# Patient Record
Sex: FEMALE | Race: WHITE | NOT HISPANIC OR LATINO | Employment: FULL TIME | URBAN - METROPOLITAN AREA
[De-identification: names, ages, dates, MRNs, and addresses within clinical notes are randomized per-mention and may not be internally consistent; named-entity substitution may affect disease eponyms.]

---

## 2017-05-03 DIAGNOSIS — M19.90 ARTHRITIS: ICD-10-CM

## 2017-05-04 RX ORDER — IBUPROFEN 800 MG/1
TABLET ORAL
Qty: 90 TABLET | Refills: 0 | Status: SHIPPED | OUTPATIENT
Start: 2017-05-04 | End: 2017-12-12 | Stop reason: SDUPTHER

## 2017-05-23 ENCOUNTER — OFFICE VISIT (OUTPATIENT)
Dept: FAMILY MEDICINE CLINIC | Facility: CLINIC | Age: 52
End: 2017-05-23

## 2017-05-23 VITALS
HEART RATE: 99 BPM | TEMPERATURE: 98.8 F | BODY MASS INDEX: 36.19 KG/M2 | HEIGHT: 65 IN | OXYGEN SATURATION: 94 % | WEIGHT: 217.2 LBS | DIASTOLIC BLOOD PRESSURE: 92 MMHG | SYSTOLIC BLOOD PRESSURE: 138 MMHG

## 2017-05-23 DIAGNOSIS — J02.9 ACUTE PHARYNGITIS, UNSPECIFIED ETIOLOGY: Primary | ICD-10-CM

## 2017-05-23 DIAGNOSIS — J30.9 ALLERGIC RHINITIS, UNSPECIFIED ALLERGIC RHINITIS TRIGGER, UNSPECIFIED RHINITIS SEASONALITY: ICD-10-CM

## 2017-05-23 LAB
EXPIRATION DATE: NORMAL
INTERNAL CONTROL: NORMAL
Lab: NORMAL
S PYO AG THROAT QL: NEGATIVE

## 2017-05-23 PROCEDURE — 87880 STREP A ASSAY W/OPTIC: CPT | Performed by: NURSE PRACTITIONER

## 2017-05-23 PROCEDURE — 99213 OFFICE O/P EST LOW 20 MIN: CPT | Performed by: NURSE PRACTITIONER

## 2017-05-23 RX ORDER — FLUTICASONE PROPIONATE 50 MCG
SPRAY, SUSPENSION (ML) NASAL
COMMUNITY
End: 2019-08-06

## 2017-12-12 ENCOUNTER — OFFICE VISIT (OUTPATIENT)
Dept: FAMILY MEDICINE CLINIC | Facility: CLINIC | Age: 52
End: 2017-12-12

## 2017-12-12 VITALS
BODY MASS INDEX: 36.69 KG/M2 | WEIGHT: 220.2 LBS | DIASTOLIC BLOOD PRESSURE: 78 MMHG | HEART RATE: 89 BPM | HEIGHT: 65 IN | SYSTOLIC BLOOD PRESSURE: 142 MMHG | TEMPERATURE: 98.2 F | OXYGEN SATURATION: 97 %

## 2017-12-12 DIAGNOSIS — L08.9 TOE INFECTION: Primary | ICD-10-CM

## 2017-12-12 DIAGNOSIS — M19.90 ARTHRITIS: ICD-10-CM

## 2017-12-12 PROCEDURE — 99213 OFFICE O/P EST LOW 20 MIN: CPT | Performed by: NURSE PRACTITIONER

## 2017-12-12 RX ORDER — IBUPROFEN 800 MG/1
800 TABLET ORAL EVERY 8 HOURS PRN
Qty: 90 TABLET | Refills: 2 | Status: SHIPPED | OUTPATIENT
Start: 2017-12-12 | End: 2019-06-12 | Stop reason: SDUPTHER

## 2017-12-12 RX ORDER — CLARITHROMYCIN 500 MG/1
500 TABLET, COATED ORAL 2 TIMES DAILY
Qty: 20 TABLET | Refills: 0 | Status: SHIPPED | OUTPATIENT
Start: 2017-12-12 | End: 2017-12-22

## 2017-12-12 NOTE — PROGRESS NOTES
"Subjective   Jie Gold is a 52 y.o. female who presents today for:    Nail Problem (tripped over lap top and nail was ripped off and she cut it too short.  Red, swollen and pain)    HPI Comments: Ms. Gold presents today with complaints of a swollen right great toe. She injured her to a week ago tripping over an object. The object had chipped the corner of her right toe nail. She has been applying OTC antibiotic ointment but the toe is not any better. She states it is red and swollen and the pain has increased.     I have reviewed the patient's medical history in detail and updated the computerized patient record.    Ms. Gold  reports that she has quit smoking. Her smoking use included Cigarettes. She quit after 30.00 years of use. She has never used smokeless tobacco. She reports that she does not drink alcohol or use illicit drugs.     Allergies   Allergen Reactions   • Penicillins Rash       Current Outpatient Prescriptions:   •  fluticasone (FLONASE) 50 MCG/ACT nasal spray, into each nostril., Disp: , Rfl:   •  ibuprofen (ADVIL,MOTRIN) 800 MG tablet, TAKE ONE TABLET BY MOUTH EVERY 8 HOURS AS NEEDED FOR MILD OR MODERATE PAIN, Disp: 90 tablet, Rfl: 0      Review of Systems   Constitutional: Negative.    Skin: Positive for wound.        Right great toe   Neurological: Negative.          Objective   Vitals:    12/12/17 1601   BP: 142/78   BP Location: Left arm   Patient Position: Sitting   Cuff Size: Adult   Pulse: 89   Temp: 98.2 °F (36.8 °C)   SpO2: 97%   Weight: 99.9 kg (220 lb 3.2 oz)   Height: 165.1 cm (65\")     Physical Exam   Constitutional: She is oriented to person, place, and time. She appears well-developed and well-nourished.   Neurological: She is alert and oriented to person, place, and time.   Skin: Skin is warm and dry.   Right great toe is swollen on the medial aspect, no drainage, or bruising present, pink in color         Assessment/Plan   Jie was seen today for nail " problem.    Diagnoses and all orders for this visit:    Toe infection  -     clarithromycin (BIAXIN) 500 MG tablet; Take 1 tablet by mouth 2 (Two) Times a Day for 10 days.    Arthritis  -     ibuprofen (ADVIL,MOTRIN) 800 MG tablet; Take 1 tablet by mouth Every 8 (Eight) Hours As Needed for Mild Pain .    She has been instructed to keep the area clean and dry. She is to start Biaxin 500 mg twice a day x 10 days. She may continue to apply OTC antibiotic ointment as needed.  Follow up at her next scheduled appointment.

## 2018-01-23 ENCOUNTER — OFFICE VISIT (OUTPATIENT)
Dept: FAMILY MEDICINE CLINIC | Facility: CLINIC | Age: 53
End: 2018-01-23

## 2018-01-23 VITALS
BODY MASS INDEX: 36.8 KG/M2 | OXYGEN SATURATION: 97 % | HEART RATE: 94 BPM | SYSTOLIC BLOOD PRESSURE: 120 MMHG | WEIGHT: 220.9 LBS | HEIGHT: 65 IN | DIASTOLIC BLOOD PRESSURE: 80 MMHG | TEMPERATURE: 98.9 F

## 2018-01-23 DIAGNOSIS — J10.1 INFLUENZA A: ICD-10-CM

## 2018-01-23 DIAGNOSIS — R05.9 COUGH: ICD-10-CM

## 2018-01-23 DIAGNOSIS — R68.89 FLU-LIKE SYMPTOMS: Primary | ICD-10-CM

## 2018-01-23 LAB
EXPIRATION DATE: ABNORMAL
FLUAV AG NPH QL: POSITIVE
FLUBV AG NPH QL: NEGATIVE
INTERNAL CONTROL: ABNORMAL
Lab: ABNORMAL

## 2018-01-23 PROCEDURE — 99213 OFFICE O/P EST LOW 20 MIN: CPT | Performed by: NURSE PRACTITIONER

## 2018-01-23 PROCEDURE — 87804 INFLUENZA ASSAY W/OPTIC: CPT | Performed by: NURSE PRACTITIONER

## 2018-01-23 RX ORDER — GUAIFENESIN AND CODEINE PHOSPHATE 100; 10 MG/5ML; MG/5ML
5 SOLUTION ORAL 3 TIMES DAILY PRN
Qty: 180 ML | Refills: 0 | Status: SHIPPED | OUTPATIENT
Start: 2018-01-23 | End: 2019-03-18

## 2018-01-23 NOTE — PROGRESS NOTES
Subjective   Jie Gold is a 52 y.o. female who presents today for:    Cough (dry and painful); Shortness of Breath (when walking); and Congestion    Influenza   This is a new problem. The current episode started in the past 7 days (5 days ago). The problem occurs constantly. The problem has been rapidly worsening. Associated symptoms include arthralgias, congestion, coughing (dry and non-productive), fatigue, headaches and myalgias. Pertinent negatives include no chills (fresolved now), fever (resolved now) or sore throat. Nothing aggravates the symptoms. She has tried NSAIDs, rest and sleep (OTC cold and flu medications) for the symptoms. The treatment provided mild relief.      I have reviewed the patient's medical history in detail and updated the computerized patient record.    Ms. Gold  reports that she has quit smoking. Her smoking use included Cigarettes. She quit after 30.00 years of use. She has never used smokeless tobacco. She reports that she does not drink alcohol or use illicit drugs.     Allergies   Allergen Reactions   • Penicillins Rash       Current Outpatient Prescriptions:   •  fluticasone (FLONASE) 50 MCG/ACT nasal spray, into each nostril., Disp: , Rfl:   •  ibuprofen (ADVIL,MOTRIN) 800 MG tablet, Take 1 tablet by mouth Every 8 (Eight) Hours As Needed for Mild Pain ., Disp: 90 tablet, Rfl: 2      Review of Systems   Constitutional: Positive for fatigue. Negative for chills (fresolved now) and fever (resolved now).   HENT: Positive for congestion and rhinorrhea. Negative for postnasal drip, sinus pain, sinus pressure and sore throat.    Respiratory: Positive for cough (dry and non-productive).    Cardiovascular: Negative.    Musculoskeletal: Positive for arthralgias and myalgias.   Skin: Negative.    Neurological: Positive for headaches.         Objective   Vitals:    01/23/18 1049   BP: 120/80   BP Location: Left arm   Patient Position: Sitting   Cuff Size: Large Adult   Pulse: 94  "  Temp: 98.9 °F (37.2 °C)   TempSrc: Oral   SpO2: 97%   Weight: 100 kg (220 lb 14.4 oz)   Height: 165.1 cm (65\")     Physical Exam   Constitutional: She is oriented to person, place, and time. She appears well-developed and well-nourished.   HENT:   Right Ear: External ear normal.   Left Ear: External ear normal.   Nose: Mucosal edema (red and swollen) present. No rhinorrhea. Right sinus exhibits no maxillary sinus tenderness and no frontal sinus tenderness. Left sinus exhibits no maxillary sinus tenderness and no frontal sinus tenderness.   Mouth/Throat: Uvula is midline and oropharynx is clear and moist.   Neck: Normal range of motion. Neck supple.   Cardiovascular: Normal rate, regular rhythm and normal heart sounds.    Pulmonary/Chest: Effort normal and breath sounds normal. No respiratory distress. She has no wheezes. She has no rales.   Lymphadenopathy:     She has no cervical adenopathy.   Neurological: She is alert and oriented to person, place, and time.   Skin: Skin is warm and dry.   Psychiatric:   No acute distress   Vitals reviewed.        Assessment/Plan   Jie was seen today for cough, shortness of breath and congestion.    Diagnoses and all orders for this visit:    Flu-like symptoms  -     POC Influenza A / B    Cough  -     guaifenesin-codeine (GUAIFENESIN AC) 100-10 MG/5ML liquid; Take 5 mL by mouth 3 (Three) Times a Day As Needed for Cough or Congestion.    Influenza A    1. POC influenza is positive for influenza A. This is her 5th day with symptoms and she has been afebrile since yesterday. Tamiflu is not indicated at this time.   2. She has been instructed to treat her symptoms. I have ordered Guaifenisen-codeine 100/10 mg per 5 ml three times a days as needed.  3. She is to return to work in 2 days.   "

## 2018-11-21 ENCOUNTER — OFFICE VISIT (OUTPATIENT)
Dept: FAMILY MEDICINE CLINIC | Facility: CLINIC | Age: 53
End: 2018-11-21

## 2018-11-21 VITALS
SYSTOLIC BLOOD PRESSURE: 124 MMHG | BODY MASS INDEX: 36.65 KG/M2 | HEART RATE: 94 BPM | HEIGHT: 65 IN | DIASTOLIC BLOOD PRESSURE: 80 MMHG | TEMPERATURE: 98.2 F | OXYGEN SATURATION: 98 % | WEIGHT: 220 LBS | RESPIRATION RATE: 16 BRPM

## 2018-11-21 DIAGNOSIS — R39.9 UTI SYMPTOMS: Primary | ICD-10-CM

## 2018-11-21 LAB
BILIRUB BLD-MCNC: ABNORMAL MG/DL
CLARITY, POC: ABNORMAL
COLOR UR: YELLOW
GLUCOSE UR STRIP-MCNC: NEGATIVE MG/DL
KETONES UR QL: ABNORMAL
LEUKOCYTE EST, POC: ABNORMAL
NITRITE UR-MCNC: NEGATIVE MG/ML
PH UR: 6.5 [PH] (ref 5–8)
PROT UR STRIP-MCNC: ABNORMAL MG/DL
RBC # UR STRIP: ABNORMAL /UL
SP GR UR: 1.02 (ref 1–1.03)
UROBILINOGEN UR QL: NORMAL

## 2018-11-21 PROCEDURE — 81003 URINALYSIS AUTO W/O SCOPE: CPT | Performed by: NURSE PRACTITIONER

## 2018-11-21 PROCEDURE — 99213 OFFICE O/P EST LOW 20 MIN: CPT | Performed by: NURSE PRACTITIONER

## 2018-11-21 RX ORDER — SULFAMETHOXAZOLE AND TRIMETHOPRIM 800; 160 MG/1; MG/1
1 TABLET ORAL 2 TIMES DAILY
Qty: 5 TABLET | Refills: 0 | Status: SHIPPED | OUTPATIENT
Start: 2018-11-21 | End: 2019-03-18

## 2018-11-21 NOTE — PROGRESS NOTES
Subjective   Jie Gold is a 53 y.o. female.     Chief Complaint   Patient presents with   • Urinary Tract Infection     symptoms      Ms. Cramer presents today with complaints of painful urination that started yesterday. She denies urgency or frequency. She does have pressure with urination. No fever, chills or body aches.   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     I have reviewed the patient's medical history in detail and updated the computerized patient record.     The following portions of the patient's history were reviewed and updated as appropriate: allergies, current medications, past family history, past medical history, past social history, past surgical history and problem list.       Current Outpatient Medications:   •  fluticasone (FLONASE) 50 MCG/ACT nasal spray, into each nostril., Disp: , Rfl:   •  guaifenesin-codeine (GUAIFENESIN AC) 100-10 MG/5ML liquid, Take 5 mL by mouth 3 (Three) Times a Day As Needed for Cough or Congestion., Disp: 180 mL, Rfl: 0  •  ibuprofen (ADVIL,MOTRIN) 800 MG tablet, Take 1 tablet by mouth Every 8 (Eight) Hours As Needed for Mild Pain ., Disp: 90 tablet, Rfl: 2    Review of Systems   Constitutional: Negative.  Negative for chills and fever.   Genitourinary: Positive for dysuria. Negative for decreased urine volume, flank pain, frequency, hematuria and urgency.       Vitals:    11/21/18 1604   BP: 124/80   Pulse: 94   Resp: 16   Temp: 98.2 °F (36.8 °C)   SpO2: 98%     Objective   Physical Exam   Constitutional: She is oriented to person, place, and time. She appears well-developed and well-nourished.   Abdominal: Soft. Bowel sounds are normal. There is no CVA tenderness.   Neurological: She is alert and oriented to person, place, and time.   Skin: Skin is warm and dry.   Psychiatric:   No acute distress   Vitals reviewed.        Assessment/Plan   Jie was seen today for urinary tract infection.    Diagnoses and all orders for this  visit:    UTI symptoms  -     POC Urinalysis Dipstick, Automated  -     Urinalysis With Microscopic - Urine, Clean Catch  -     Urine Culture - Urine, Urine, Clean Catch    Other orders  -     Cancel: POC Blood, Urine, Qualitative, Dipstick  -     Cancel: Urinalysis With Microscopic - Urine, Clean Catch; Future  -     sulfamethoxazole-trimethoprim (BACTRIM DS) 800-160 MG per tablet; Take 1 tablet by mouth 2 (Two) Times a Day.    POC urine is positive for leukocytes and RBC. Negative for nitrates. I will send her urine out for culture and microscopic U/A. I will start her on Bactrim DS 1 tab twice daily x 5 days. Follow up if symptoms worsen or do not resolve.

## 2018-11-25 LAB
APPEARANCE UR: (no result)
BACTERIA #/AREA URNS HPF: ABNORMAL /[HPF]
BACTERIA UR CULT: ABNORMAL
BACTERIA UR CULT: ABNORMAL
BILIRUB UR QL STRIP: NEGATIVE
COLOR UR: YELLOW
CRYSTALS URNS MICRO: ABNORMAL
EPI CELLS #/AREA URNS HPF: ABNORMAL /HPF
GLUCOSE UR QL: NEGATIVE
HGB UR QL STRIP: (no result)
KETONES UR QL STRIP: (no result)
LEUKOCYTE ESTERASE UR QL STRIP: (no result)
MICRO URNS: (no result)
MUCOUS THREADS URNS QL MICRO: PRESENT
NITRITE UR QL STRIP: NEGATIVE
OTHER ANTIBIOTIC SUSC ISLT: ABNORMAL
PH UR STRIP: 6 [PH] (ref 5–7.5)
PROT UR QL STRIP: (no result)
RBC #/AREA URNS HPF: >30 /HPF
SP GR UR: 1.02 (ref 1–1.03)
UNIDENT CRYS URNS QL MICRO: PRESENT
UROBILINOGEN UR STRIP-MCNC: 1 MG/DL (ref 0.2–1)
WBC #/AREA URNS HPF: >30 /HPF

## 2019-03-18 ENCOUNTER — OFFICE VISIT (OUTPATIENT)
Dept: FAMILY MEDICINE CLINIC | Facility: CLINIC | Age: 54
End: 2019-03-18

## 2019-03-18 VITALS
WEIGHT: 231 LBS | RESPIRATION RATE: 16 BRPM | HEIGHT: 65 IN | TEMPERATURE: 98.6 F | DIASTOLIC BLOOD PRESSURE: 78 MMHG | OXYGEN SATURATION: 98 % | SYSTOLIC BLOOD PRESSURE: 130 MMHG | BODY MASS INDEX: 38.49 KG/M2 | HEART RATE: 99 BPM

## 2019-03-18 DIAGNOSIS — R03.0 ELEVATED BLOOD PRESSURE READING: Primary | ICD-10-CM

## 2019-03-18 PROCEDURE — 99213 OFFICE O/P EST LOW 20 MIN: CPT | Performed by: NURSE PRACTITIONER

## 2019-03-18 NOTE — PROGRESS NOTES
"Subjective   Jie Gold is a 53 y.o. female.     Chief Complaint   Patient presents with   • Hypertension     Ms. Gold presents today because she had elevated blood pressure readings 3 days ago on Friday. She denies headaches, vision changes, edema, chest pain, fatigue or shortness of breath. She is not on any medications for hypertension. She does take NSAIDS for pain as needed.        I have reviewed the patient's medical history in detail and updated the computerized patient record.    The following portions of the patient's history were reviewed and updated as appropriate: allergies, current medications, past family history, past medical history, past social history, past surgical history and problem list.       Current Outpatient Medications:   •  fluticasone (FLONASE) 50 MCG/ACT nasal spray, into each nostril., Disp: , Rfl:   •  ibuprofen (ADVIL,MOTRIN) 800 MG tablet, Take 1 tablet by mouth Every 8 (Eight) Hours As Needed for Mild Pain ., Disp: 90 tablet, Rfl: 2  •  guaifenesin-codeine (GUAIFENESIN AC) 100-10 MG/5ML liquid, Take 5 mL by mouth 3 (Three) Times a Day As Needed for Cough or Congestion., Disp: 180 mL, Rfl: 0  •  sulfamethoxazole-trimethoprim (BACTRIM DS) 800-160 MG per tablet, Take 1 tablet by mouth 2 (Two) Times a Day., Disp: 5 tablet, Rfl: 0    Review of Systems   Constitutional: Negative.    Respiratory: Negative.    Cardiovascular: Negative.    Musculoskeletal: Negative.    Neurological: Negative.         Vitals:    03/18/19 1556   BP: 130/78   BP Location: Left arm   Patient Position: Sitting   Cuff Size: Adult   Pulse: 99   Resp: 16   Temp: 98.6 °F (37 °C)   TempSrc: Oral   SpO2: 98%   Weight: 105 kg (231 lb)   Height: 165.1 cm (65\")       Objective   Physical Exam   Constitutional: She is oriented to person, place, and time. She appears well-developed and well-nourished.   Cardiovascular: Normal rate, regular rhythm, normal heart sounds and intact distal pulses.   Pulmonary/Chest: " Effort normal and breath sounds normal.   Neurological: She is alert and oriented to person, place, and time.   Skin: Skin is warm and dry.   Psychiatric:   No acute distress   Vitals reviewed.        Assessment/Plan   Jie was seen today for hypertension.    Diagnoses and all orders for this visit:    Elevated blood pressure reading      Her blood pressure today in the office is 130/78. I have asked her to start checking her blood pressure daily over the next 2 weeks and record the pressures in a log. She is to return in 2 weeks with her log to re-evaluate her blood pressure.

## 2019-04-03 ENCOUNTER — OFFICE VISIT (OUTPATIENT)
Dept: FAMILY MEDICINE CLINIC | Facility: CLINIC | Age: 54
End: 2019-04-03

## 2019-04-03 VITALS
TEMPERATURE: 98.3 F | SYSTOLIC BLOOD PRESSURE: 118 MMHG | HEIGHT: 65 IN | WEIGHT: 233 LBS | DIASTOLIC BLOOD PRESSURE: 80 MMHG | RESPIRATION RATE: 16 BRPM | OXYGEN SATURATION: 98 % | BODY MASS INDEX: 38.82 KG/M2 | HEART RATE: 84 BPM

## 2019-04-03 DIAGNOSIS — R03.0 ELEVATED BLOOD-PRESSURE READING WITHOUT DIAGNOSIS OF HYPERTENSION: ICD-10-CM

## 2019-04-03 DIAGNOSIS — R10.11 RIGHT UPPER QUADRANT ABDOMINAL PAIN: Primary | ICD-10-CM

## 2019-04-03 PROCEDURE — 99214 OFFICE O/P EST MOD 30 MIN: CPT | Performed by: NURSE PRACTITIONER

## 2019-04-03 NOTE — PROGRESS NOTES
"Subjective   Jie Gold is a 54 y.o. female.     Chief Complaint   Patient presents with   • Hypertension     2 week f/u     Ms. Gold presents today to follow up on an episode of elevated blood pressure that she had 2 weeks ago. She states she is feeling much better than she did at her last visit and that she has been keeping a log of her blood pressures. The highest her B/P has been running was 141/87.     I have reviewed the patient's medical history in detail and updated the computerized patient record.    The following portions of the patient's history were reviewed and updated as appropriate: allergies, current medications, past family history, past medical history, past social history, past surgical history and problem list.       Current Outpatient Medications:   •  fluticasone (FLONASE) 50 MCG/ACT nasal spray, into each nostril., Disp: , Rfl:   •  ibuprofen (ADVIL,MOTRIN) 800 MG tablet, Take 1 tablet by mouth Every 8 (Eight) Hours As Needed for Mild Pain ., Disp: 90 tablet, Rfl: 2        Review of Systems   Constitutional: Negative.    Respiratory: Negative.    Cardiovascular: Negative.    Gastrointestinal: Positive for abdominal pain. Negative for abdominal distention, blood in stool, constipation, diarrhea, nausea and vomiting.   Genitourinary: Difficulty urinating: intermittent right upper quadrent pain.   Neurological: Negative.         Vitals:    04/03/19 1605   BP: 118/80   BP Location: Left arm   Patient Position: Sitting   Cuff Size: Adult   Pulse: 84   Resp: 16   Temp: 98.3 °F (36.8 °C)   TempSrc: Oral   SpO2: 98%   Weight: 106 kg (233 lb)   Height: 165.1 cm (65\")       Objective   Physical Exam   Constitutional: She is oriented to person, place, and time. She appears well-developed and well-nourished.   Cardiovascular: Normal rate, regular rhythm and normal heart sounds.   Pulmonary/Chest: Effort normal and breath sounds normal.   Abdominal: Soft. Bowel sounds are normal. There is no " tenderness.   Neurological: She is alert and oriented to person, place, and time.   Skin: Skin is warm and dry.   Psychiatric:   No acute distress   Vitals reviewed.        Assessment/Plan   Jie was seen today for hypertension.    Diagnoses and all orders for this visit:    Right upper quadrant abdominal pain  -     US Abdomen Complete    Elevated blood-pressure reading without diagnosis of hypertension    1. I reviewed the blood pressure log that she brought with her today. Her blood pressures have been running from 112/64 to 141/87. She is averaging between 120-130/ 70 - 80.   2. We discussed life style changes she can make to help manage her blood pressure and assist with weight loss.  3. She is still having intermittent right upper quadrant abdominal pain. She no longer has her gallbladder. We discussed doing a abdominal ultrasound at her last visit. I have ordered one today for further evaluation.   3. She is to follow up with me in 6 months on her blood pressure.

## 2019-06-12 ENCOUNTER — OFFICE VISIT (OUTPATIENT)
Dept: FAMILY MEDICINE CLINIC | Facility: CLINIC | Age: 54
End: 2019-06-12

## 2019-06-12 VITALS
BODY MASS INDEX: 38.51 KG/M2 | TEMPERATURE: 98 F | DIASTOLIC BLOOD PRESSURE: 68 MMHG | HEIGHT: 65 IN | RESPIRATION RATE: 16 BRPM | WEIGHT: 231.1 LBS | OXYGEN SATURATION: 98 % | SYSTOLIC BLOOD PRESSURE: 118 MMHG | HEART RATE: 91 BPM

## 2019-06-12 DIAGNOSIS — R20.2 NUMBNESS AND TINGLING IN BOTH HANDS: ICD-10-CM

## 2019-06-12 DIAGNOSIS — M19.90 ARTHRITIS: ICD-10-CM

## 2019-06-12 DIAGNOSIS — R20.0 NUMBNESS AND TINGLING IN BOTH HANDS: ICD-10-CM

## 2019-06-12 DIAGNOSIS — M25.50 GENERALIZED JOINT PAIN: Primary | ICD-10-CM

## 2019-06-12 PROCEDURE — 99214 OFFICE O/P EST MOD 30 MIN: CPT | Performed by: NURSE PRACTITIONER

## 2019-06-12 PROCEDURE — 72040 X-RAY EXAM NECK SPINE 2-3 VW: CPT | Performed by: NURSE PRACTITIONER

## 2019-06-12 RX ORDER — IBUPROFEN 800 MG/1
800 TABLET ORAL EVERY 8 HOURS PRN
Qty: 90 TABLET | Refills: 2 | Status: SHIPPED | OUTPATIENT
Start: 2019-06-12 | End: 2020-06-23

## 2019-06-14 LAB
ANA SER QL: NEGATIVE
CARDIOLIPIN IGG SER IA-ACNC: <9 GPL U/ML (ref 0–14)
CARDIOLIPIN IGM SER IA-ACNC: <9 MPL U/ML (ref 0–12)
CCP IGA+IGG SERPL IA-ACNC: 4 UNITS (ref 0–19)
CENTROMERE B AB SER-ACNC: <0.2 AI (ref 0–0.9)
CHROMATIN AB SERPL-ACNC: <0.2 AI (ref 0–0.9)
CK SERPL-CCNC: 82 U/L (ref 24–173)
CRP SERPL-MCNC: 7.4 MG/L (ref 0–4.9)
DSDNA AB SER-ACNC: <1 IU/ML (ref 0–9)
ENA JO1 AB SER-ACNC: <0.2 AI (ref 0–0.9)
ENA RNP AB SER-ACNC: <0.2 AI (ref 0–0.9)
ENA SCL70 AB SER-ACNC: <0.2 AI (ref 0–0.9)
ENA SM AB SER-ACNC: <0.2 AI (ref 0–0.9)
ENA SS-A AB SER-ACNC: <0.2 AI (ref 0–0.9)
ENA SS-B AB SER-ACNC: <0.2 AI (ref 0–0.9)
ERYTHROCYTE [SEDIMENTATION RATE] IN BLOOD BY WESTERGREN METHOD: NORMAL MM/HR
Lab: NORMAL
SPECIMEN STATUS: NORMAL

## 2019-08-06 ENCOUNTER — OFFICE VISIT (OUTPATIENT)
Dept: FAMILY MEDICINE CLINIC | Facility: CLINIC | Age: 54
End: 2019-08-06

## 2019-08-06 VITALS
TEMPERATURE: 98.3 F | HEART RATE: 92 BPM | SYSTOLIC BLOOD PRESSURE: 118 MMHG | BODY MASS INDEX: 38.49 KG/M2 | DIASTOLIC BLOOD PRESSURE: 70 MMHG | RESPIRATION RATE: 16 BRPM | OXYGEN SATURATION: 98 % | WEIGHT: 231 LBS | HEIGHT: 65 IN

## 2019-08-06 DIAGNOSIS — R22.41 FOOT MASS, RIGHT: Primary | ICD-10-CM

## 2019-08-06 PROCEDURE — 99213 OFFICE O/P EST LOW 20 MIN: CPT | Performed by: NURSE PRACTITIONER

## 2019-08-06 PROCEDURE — 73630 X-RAY EXAM OF FOOT: CPT | Performed by: NURSE PRACTITIONER

## 2019-08-06 NOTE — PROGRESS NOTES
"Subjective   Jie Gold is a 54 y.o. female.     Chief Complaint   Patient presents with   • Foot Swelling     Ms. Gold presents today with concerns about a lump she has on the bottom of her right foot. She noticed the lump 5 days ago and reports it feels like there is something in her shoe when she walks. She denies any injuries to her foot.     I have reviewed the patient's medical history in detail and updated the computerized patient record.     The following portions of the patient's history were reviewed and updated as appropriate: allergies, current medications, past family history, past medical history, past social history, past surgical history and problem list.       Current Outpatient Medications:   •  fluticasone (FLONASE) 50 MCG/ACT nasal spray, into each nostril., Disp: , Rfl:   •  ibuprofen (ADVIL,MOTRIN) 800 MG tablet, Take 1 tablet by mouth Every 8 (Eight) Hours As Needed for Mild Pain ., Disp: 90 tablet, Rfl: 2    Review of Systems   Constitutional: Negative.    Musculoskeletal:        Lump on the bottom of her right foot        Vitals:    08/06/19 1605   BP: 118/70   BP Location: Left arm   Patient Position: Sitting   Cuff Size: Adult   Pulse: 92   Resp: 16   Temp: 98.3 °F (36.8 °C)   TempSrc: Oral   SpO2: 98%   Weight: 105 kg (231 lb)   Height: 165.1 cm (65\")       Objective   Physical Exam   Constitutional: She is oriented to person, place, and time. She appears well-developed and well-nourished.   Musculoskeletal:        Right foot: There is swelling (soft tissue swelling located between the ball of her right foot and the arch. it is soft  and mobile). There is normal range of motion.   Neurological: She is alert and oriented to person, place, and time.   Psychiatric:   No acute distress   Vitals reviewed.        Assessment/Plan   Jie was seen today for foot swelling.    Diagnoses and all orders for this visit:    Foot mass, right  -     XR Foot 3+ View Right  -     Ambulatory " Referral to Podiatry      Ms. Gold present today with complaints of a lump on the bottom of her right foot. I have reviewed the x-ray of her right foot. I do see some soft tissue swelling but no other abnormalities. I have no other films to compare this too. I am referring her to podiatry for further evaluation.

## 2019-09-20 ENCOUNTER — OFFICE VISIT (OUTPATIENT)
Dept: FAMILY MEDICINE CLINIC | Facility: CLINIC | Age: 54
End: 2019-09-20

## 2019-09-20 VITALS
RESPIRATION RATE: 16 BRPM | SYSTOLIC BLOOD PRESSURE: 150 MMHG | WEIGHT: 229.5 LBS | HEIGHT: 65 IN | TEMPERATURE: 98.3 F | HEART RATE: 86 BPM | DIASTOLIC BLOOD PRESSURE: 90 MMHG | OXYGEN SATURATION: 98 % | BODY MASS INDEX: 38.24 KG/M2

## 2019-09-20 DIAGNOSIS — J30.1 SEASONAL ALLERGIC RHINITIS DUE TO POLLEN: ICD-10-CM

## 2019-09-20 DIAGNOSIS — R05.9 COUGH: Primary | ICD-10-CM

## 2019-09-20 PROCEDURE — 99213 OFFICE O/P EST LOW 20 MIN: CPT | Performed by: NURSE PRACTITIONER

## 2019-09-20 RX ORDER — BROMPHENIRAMINE MALEATE, PSEUDOEPHEDRINE HYDROCHLORIDE, AND DEXTROMETHORPHAN HYDROBROMIDE 2; 30; 10 MG/5ML; MG/5ML; MG/5ML
5 SYRUP ORAL 4 TIMES DAILY PRN
Qty: 118 ML | Refills: 0 | Status: SHIPPED | OUTPATIENT
Start: 2019-09-20 | End: 2020-03-06

## 2019-09-20 NOTE — PROGRESS NOTES
"Subjective   Jie Gold is a 54 y.o. female.     Cough   This is a new problem. The current episode started yesterday (`started last night). The problem has been unchanged. The problem occurs every few minutes. The cough is non-productive. Associated symptoms include chills and nasal congestion. Pertinent negatives include no ear congestion, fever, heartburn, myalgias, postnasal drip, rhinorrhea, shortness of breath or wheezing. Nothing aggravates the symptoms. She has tried nothing for the symptoms.      I have reviewed the patient's medical history in detail and updated the computerized patient record.    The following portions of the patient's history were reviewed and updated as appropriate: allergies, current medications, past family history, past medical history, past social history, past surgical history and problem list.       Current Outpatient Medications:   •  ibuprofen (ADVIL,MOTRIN) 800 MG tablet, Take 1 tablet by mouth Every 8 (Eight) Hours As Needed for Mild Pain ., Disp: 90 tablet, Rfl: 2  •  brompheniramine-pseudoephedrine-DM 30-2-10 MG/5ML syrup, Take 5 mL by mouth 4 (Four) Times a Day As Needed for Congestion or Cough., Disp: 118 mL, Rfl: 0    Review of Systems   Constitutional: Positive for chills. Negative for fever.   HENT: Negative for postnasal drip and rhinorrhea.    Respiratory: Positive for cough. Negative for shortness of breath and wheezing.    Musculoskeletal: Negative for myalgias.        Vitals:    09/20/19 1551   BP: 150/90   BP Location: Left arm   Patient Position: Sitting   Cuff Size: Adult   Pulse: 86   Resp: 16   Temp: 98.3 °F (36.8 °C)   TempSrc: Oral   SpO2: 98%   Weight: 104 kg (229 lb 8 oz)   Height: 165.1 cm (65\")       Objective   Physical Exam   Constitutional: She is oriented to person, place, and time.   HENT:   Right Ear: Tympanic membrane is not erythematous and not bulging. A middle ear effusion is present.   Left Ear: Tympanic membrane is not erythematous and " not bulging. A middle ear effusion is present.   Mouth/Throat: Oropharynx is clear and moist.   Cardiovascular: Normal rate, regular rhythm, normal heart sounds and intact distal pulses.   Pulmonary/Chest: Effort normal and breath sounds normal. She has no wheezes. She has no rales.   Neurological: She is alert and oriented to person, place, and time.   Skin: Skin is warm and dry.   Vitals reviewed.        Assessment/Plan   Jie was seen today for uri.    Diagnoses and all orders for this visit:    Cough    Seasonal allergic rhinitis due to pollen    Other orders  -     brompheniramine-pseudoephedrine-DM 30-2-10 MG/5ML syrup; Take 5 mL by mouth 4 (Four) Times a Day As Needed for Congestion or Cough.        You have been diagnosed with allergic rhinitis. Symptomatic treatment is best.  You may take Mucinex D for relieving congestion and cough.  If you have high blood pressure, do not take Mucinex D, instead opting for plain Mucinex and Coricidin HBP. Oral antihistamine, such as Allegra, Zyrtec or Claritin may help reduce ear pressure and relieve some nasal symptoms.  A saline nasal spray may be used to keep nose clear from discharge.  Be sure that you are increasing your intake of clear to decaffeinated fluids and get plenty of rest.  If your symptoms worsen or persist follow up as needed.

## 2020-03-06 ENCOUNTER — OFFICE VISIT (OUTPATIENT)
Dept: FAMILY MEDICINE CLINIC | Facility: CLINIC | Age: 55
End: 2020-03-06

## 2020-03-06 VITALS
HEIGHT: 65 IN | WEIGHT: 231.9 LBS | SYSTOLIC BLOOD PRESSURE: 132 MMHG | DIASTOLIC BLOOD PRESSURE: 82 MMHG | BODY MASS INDEX: 38.64 KG/M2 | HEART RATE: 88 BPM | OXYGEN SATURATION: 97 %

## 2020-03-06 DIAGNOSIS — M25.512 CHRONIC LEFT SHOULDER PAIN: Primary | ICD-10-CM

## 2020-03-06 DIAGNOSIS — G89.29 CHRONIC LEFT SHOULDER PAIN: Primary | ICD-10-CM

## 2020-03-06 PROCEDURE — 99213 OFFICE O/P EST LOW 20 MIN: CPT | Performed by: NURSE PRACTITIONER

## 2020-03-06 NOTE — PROGRESS NOTES
Subjective   Jie Gold is a 54 y.o. female.     Chief Complaint   Patient presents with   • Shoulder Pain     Left     Shoulder Injury    Incident location: no inury. The left shoulder is affected. Incident onset: pain has been going on for a year. There was no injury mechanism. The quality of the pain is described as aching. The pain does not radiate. The pain is at a severity of 4/10. The pain is mild. Pertinent negatives include no numbness. Exacerbated by: sleeping on the affected side. She has tried NSAIDs for the symptoms. The treatment provided moderate relief.        I have reviewed the patient's medical history in detail and updated the computerized patient record.    The following portions of the patient's history were reviewed and updated as appropriate: allergies, current medications, past family history, past medical history, past social history, past surgical history and problem list.      Current Outpatient Medications:   •  ibuprofen (ADVIL,MOTRIN) 800 MG tablet, Take 1 tablet by mouth Every 8 (Eight) Hours As Needed for Mild Pain ., Disp: 90 tablet, Rfl: 2     Review of Systems   Constitutional: Negative.    Respiratory: Negative.    Cardiovascular: Negative.    Musculoskeletal: Positive for arthralgias (left shoulder pain).   Neurological: Negative.  Negative for numbness.       Objective    Vitals:    03/06/20 1610   BP: 132/82   Pulse: 88   SpO2: 97%     Physical Exam   Constitutional: She is oriented to person, place, and time. She appears well-developed and well-nourished.   Cardiovascular: Normal rate, regular rhythm and normal heart sounds.   Pulmonary/Chest: Effort normal and breath sounds normal.   Musculoskeletal:        Left shoulder: She exhibits normal range of motion, no swelling, no effusion, no pain, no spasm and normal strength.   Neurological: She is alert and oriented to person, place, and time.   Skin: Skin is warm and dry.   Psychiatric:   No acute distress   Vitals  reviewed.        Assessment/Plan   Jie was seen today for shoulder pain.    Diagnoses and all orders for this visit:    Chronic left shoulder pain  -     Ambulatory Referral to Physical Therapy Evaluate and treat; Stretching, Strengthening    Ms Gold presents today with complaints of shoulder pain that has been going on for a year. She still has ROM, no swelling or effusion.  I am referring her to physical therapy for evaluation and treatment of left shoulder pain.   She is to follow up at her next scheduled appointment.

## 2020-05-04 ENCOUNTER — TELEPHONE (OUTPATIENT)
Dept: FAMILY MEDICINE CLINIC | Facility: CLINIC | Age: 55
End: 2020-05-04

## 2020-05-04 NOTE — TELEPHONE ENCOUNTER
Called patient- she is having SOA that has been going on for approx 1 year. She also reports her heart fluttering when exerting herself.  She denies chest pain.     She also denied cough, sorethroat, and fever. Scheduled her for tomorrow at 8:45.

## 2020-05-04 NOTE — TELEPHONE ENCOUNTER
PATIENT STATES SHE IS HAVING SHORTNESS BREATH JUST BY WALKING.PATIENT REPORTS NOT CONSTANT COUGH ,SORE THROAT AND DOES NOT HAVE A  FEVER. PATIENT DECLINED VIDEO VISIT.    PLEASE ADVISE

## 2020-05-05 ENCOUNTER — OFFICE VISIT (OUTPATIENT)
Dept: FAMILY MEDICINE CLINIC | Facility: CLINIC | Age: 55
End: 2020-05-05

## 2020-05-05 VITALS
WEIGHT: 262 LBS | SYSTOLIC BLOOD PRESSURE: 126 MMHG | HEIGHT: 65 IN | TEMPERATURE: 96 F | BODY MASS INDEX: 43.65 KG/M2 | HEART RATE: 92 BPM | DIASTOLIC BLOOD PRESSURE: 86 MMHG | OXYGEN SATURATION: 98 %

## 2020-05-05 DIAGNOSIS — E66.9 OBESITY WITH SERIOUS COMORBIDITY, UNSPECIFIED CLASSIFICATION, UNSPECIFIED OBESITY TYPE: ICD-10-CM

## 2020-05-05 DIAGNOSIS — F41.9 ANXIETY: ICD-10-CM

## 2020-05-05 DIAGNOSIS — R00.2 PALPITATIONS: Primary | ICD-10-CM

## 2020-05-05 DIAGNOSIS — R53.83 FATIGUE, UNSPECIFIED TYPE: ICD-10-CM

## 2020-05-05 DIAGNOSIS — R06.02 SHORTNESS OF BREATH ON EXERTION: ICD-10-CM

## 2020-05-05 LAB
ALBUMIN SERPL-MCNC: 4.2 G/DL (ref 3.5–5.2)
ALBUMIN/GLOB SERPL: 1.6 G/DL
ALP SERPL-CCNC: 115 U/L (ref 39–117)
ALT SERPL-CCNC: 23 U/L (ref 1–33)
AST SERPL-CCNC: 18 U/L (ref 1–32)
BILIRUB SERPL-MCNC: 0.4 MG/DL (ref 0.2–1.2)
BUN SERPL-MCNC: 12 MG/DL (ref 6–20)
BUN/CREAT SERPL: 12.6 (ref 7–25)
CALCIUM SERPL-MCNC: 9 MG/DL (ref 8.6–10.5)
CHLORIDE SERPL-SCNC: 101 MMOL/L (ref 98–107)
CO2 SERPL-SCNC: 28.9 MMOL/L (ref 22–29)
CREAT SERPL-MCNC: 0.95 MG/DL (ref 0.57–1)
ERYTHROCYTE [DISTWIDTH] IN BLOOD BY AUTOMATED COUNT: 12.8 % (ref 12.3–15.4)
GLOBULIN SER CALC-MCNC: 2.7 GM/DL
GLUCOSE SERPL-MCNC: 96 MG/DL (ref 65–99)
HBA1C MFR BLD: 5.2 % (ref 4.8–5.6)
HCT VFR BLD AUTO: 39.8 % (ref 34–46.6)
HGB BLD-MCNC: 13.6 G/DL (ref 12–15.9)
IRON SATN MFR SERPL: 24 % (ref 20–50)
IRON SERPL-MCNC: 98 MCG/DL (ref 37–145)
MCH RBC QN AUTO: 28.3 PG (ref 26.6–33)
MCHC RBC AUTO-ENTMCNC: 34.2 G/DL (ref 31.5–35.7)
MCV RBC AUTO: 82.7 FL (ref 79–97)
PLATELET # BLD AUTO: 246 10*3/MM3 (ref 140–450)
POTASSIUM SERPL-SCNC: 4.4 MMOL/L (ref 3.5–5.2)
PROT SERPL-MCNC: 6.9 G/DL (ref 6–8.5)
RBC # BLD AUTO: 4.81 10*6/MM3 (ref 3.77–5.28)
SODIUM SERPL-SCNC: 138 MMOL/L (ref 136–145)
TIBC SERPL-MCNC: 414 MCG/DL
TSH SERPL DL<=0.005 MIU/L-ACNC: 3.57 UIU/ML (ref 0.27–4.2)
UIBC SERPL-MCNC: 316 MCG/DL (ref 112–346)
WBC # BLD AUTO: 4.86 10*3/MM3 (ref 3.4–10.8)

## 2020-05-05 PROCEDURE — 93000 ELECTROCARDIOGRAM COMPLETE: CPT | Performed by: NURSE PRACTITIONER

## 2020-05-05 PROCEDURE — 99214 OFFICE O/P EST MOD 30 MIN: CPT | Performed by: NURSE PRACTITIONER

## 2020-05-05 RX ORDER — OXYMETAZOLINE HYDROCHLORIDE 0.05 G/100ML
2 SPRAY NASAL AS NEEDED
COMMUNITY
End: 2020-11-20

## 2020-05-05 RX ORDER — SERTRALINE HYDROCHLORIDE 25 MG/1
25 TABLET, FILM COATED ORAL DAILY
Qty: 30 TABLET | Refills: 5 | Status: SHIPPED | OUTPATIENT
Start: 2020-05-05 | End: 2020-10-21

## 2020-05-05 RX ORDER — FLUTICASONE PROPIONATE 50 MCG
2 SPRAY, SUSPENSION (ML) NASAL DAILY
COMMUNITY

## 2020-05-05 NOTE — PROGRESS NOTES
Subjective   Jie Gold is a 55 y.o. female.     Chief Complaint   Patient presents with   • Shortness of Breath     Ms. Gold presents today because she has been having difficulty breathing for off and on for the past year. She reports shortness of breath with exertion, no energy, and sometimes she can feel her heart racing/palpitations. She doesn't know if this is related to her weight gain over the past year since she has lost weight or if it is her allergies. She knows that she has difficulty breathing through her nose because of her allergies. She also reports she has anxiety and she has difficulty falling asleep at night because she worries and can't turn her mind off.        I have reviewed the patient's medical history in detail and updated the computerized patient record.    The following portions of the patient's history were reviewed and updated as appropriate: allergies, current medications, past family history, past medical history, past social history, past surgical history and problem list.      Current Outpatient Medications:   •  fluticasone (FLONASE) 50 MCG/ACT nasal spray, 2 sprays into the nostril(s) as directed by provider Daily., Disp: , Rfl:   •  ibuprofen (ADVIL,MOTRIN) 800 MG tablet, Take 1 tablet by mouth Every 8 (Eight) Hours As Needed for Mild Pain ., Disp: 90 tablet, Rfl: 2  •  oxymetazoline (AFRIN) 0.05 % nasal spray, 2 sprays into the nostril(s) as directed by provider As Needed., Disp: , Rfl:   •  sertraline (ZOLOFT) 25 MG tablet, Take 1 tablet by mouth Daily., Disp: 30 tablet, Rfl: 5     Review of Systems   Constitutional: Negative.         Has gained weight since she quit smoking a year ago   Respiratory: Positive for shortness of breath (with exertion). Negative for cough, chest tightness and wheezing.    Cardiovascular: Positive for palpitations. Negative for chest pain and leg swelling.   Neurological: Negative.  Negative for dizziness and light-headedness.    Psychiatric/Behavioral: Positive for sleep disturbance (has difficulty falling asleep). The patient is nervous/anxious.        Objective    Vitals:    05/05/20 0845   BP: 126/86   Pulse: 92   Temp: 96 °F (35.6 °C)   SpO2: 98%     Body mass index is 43.6 kg/m².     Physical Exam   Constitutional: She is oriented to person, place, and time. She appears well-developed and well-nourished.   Neck: Normal range of motion. Neck supple. No thyromegaly present.   Cardiovascular: Normal rate, regular rhythm, normal heart sounds and intact distal pulses.   Pulmonary/Chest: Effort normal and breath sounds normal.   Lymphadenopathy:     She has no cervical adenopathy.   Neurological: She is alert and oriented to person, place, and time.   Skin: Skin is warm and dry.   Psychiatric:   No acute distress   Vitals reviewed.        Assessment/Plan   Jie was seen today for shortness of breath.    Diagnoses and all orders for this visit:    Palpitations  -     ECG 12 Lead  -     Iron Profile  -     TSH  -     CBC (No Diff)  -     Comprehensive Metabolic Panel    Fatigue, unspecified type  -     Iron Profile  -     TSH  -     CBC (No Diff)  -     Hemoglobin A1c  -     Comprehensive Metabolic Panel    Anxiety  -     Iron Profile  -     TSH  -     CBC (No Diff)  -     sertraline (ZOLOFT) 25 MG tablet; Take 1 tablet by mouth Daily.    Shortness of breath on exertion    Obesity with serious comorbidity, unspecified classification, unspecified obesity type      Ms. Gold presents today with several concerns.   I have ordered and reviewed an EKG today. Her EKG shows normal sinus rhythm. I have no other EKGs to compare this to.   I have ordered the above noted labs to assess for thyroid disease, anemia and diabetes.   I have also discussed her anxiety with her and she has agreed to start Zoloft 25 mg daily.   She is to follow up with me in 6 weeks on her anxiety.

## 2020-06-16 ENCOUNTER — TELEPHONE (OUTPATIENT)
Dept: FAMILY MEDICINE CLINIC | Facility: CLINIC | Age: 55
End: 2020-06-16

## 2020-06-16 NOTE — TELEPHONE ENCOUNTER
LMTRC, I am not sure what kind of pain she has. We did refer to PT for L shoulder pain in March- maybe it was canceled d/t COVID.      HUB please transfer if she calls.

## 2020-06-16 NOTE — TELEPHONE ENCOUNTER
PATIENT CALLING IN TO ON THE REFERRAL TO PHYSICAL THERAPY.     SHE STATES THE PAIN GETS SO BAD SHE GETS NAUSEAU.  IT IS A SHARP SHOOTING PAIN    PLEASE CALL AND ADVISE [phone]

## 2020-06-17 NOTE — TELEPHONE ENCOUNTER
Scheduled patient for the 23rd @ 4 to get her shoulder examined. She is not comfortable doing PT d/t the popping and pain. We will reeval and also do a f/u on her new medication

## 2020-06-23 ENCOUNTER — OFFICE VISIT (OUTPATIENT)
Dept: FAMILY MEDICINE CLINIC | Facility: CLINIC | Age: 55
End: 2020-06-23

## 2020-06-23 VITALS
TEMPERATURE: 97.8 F | HEART RATE: 94 BPM | WEIGHT: 230 LBS | BODY MASS INDEX: 38.32 KG/M2 | OXYGEN SATURATION: 97 % | DIASTOLIC BLOOD PRESSURE: 84 MMHG | HEIGHT: 65 IN | SYSTOLIC BLOOD PRESSURE: 138 MMHG

## 2020-06-23 DIAGNOSIS — F41.9 ANXIETY: ICD-10-CM

## 2020-06-23 DIAGNOSIS — M25.512 CHRONIC LEFT SHOULDER PAIN: Primary | ICD-10-CM

## 2020-06-23 DIAGNOSIS — G89.29 CHRONIC LEFT SHOULDER PAIN: Primary | ICD-10-CM

## 2020-06-23 PROCEDURE — 99214 OFFICE O/P EST MOD 30 MIN: CPT | Performed by: NURSE PRACTITIONER

## 2020-06-23 PROCEDURE — 73030 X-RAY EXAM OF SHOULDER: CPT | Performed by: NURSE PRACTITIONER

## 2020-06-23 RX ORDER — ACETAMINOPHEN 325 MG/1
650 TABLET ORAL EVERY 6 HOURS PRN
COMMUNITY

## 2020-06-23 NOTE — PROGRESS NOTES
Subjective   Jie Gold is a 55 y.o. female.     Chief Complaint   Patient presents with   • Shoulder Pain     LT   • Anxiety     Ms. Gold is here today to follow up on her anxiety and left shoulder pain. I had seen her back in March for her shoulder pain. At that time I had referred her to physical therapy for evaluation and treatment. Covid restrictions started shortly after her visit and the PT was postponed. She did not follow up on the PT once once face to face visit were allowed because she did not want to put herself at risk for covid. She reports she is still having pain and decreased ROM in some positions of her arm.   She is also following up on her anxiety. She was started on Zoloft 25 mg at her last visit. She reports that the Zoloft has really helped her anxiety. She is no longer having heart palpitations.       Arm Pain    Incident onset: no incident. The pain is present in the left shoulder. The quality of the pain is described as aching. The pain does not radiate. The pain is moderate. The pain has been constant since the incident. Associated symptoms include tingling (haydee at night in her hands). Pertinent negatives include no numbness. The symptoms are aggravated by movement and lifting. She has tried acetaminophen and NSAIDs for the symptoms.        I have reviewed the patient's medical history in detail and updated the computerized patient record.    The following portions of the patient's history were reviewed and updated as appropriate: allergies, current medications, past family history, past medical history, past social history, past surgical history and problem list.      Current Outpatient Medications:   •  acetaminophen (TYLENOL) 325 MG tablet, Take 650 mg by mouth Every 6 (Six) Hours As Needed for Mild Pain , Moderate Pain , Headache or Fever., Disp: , Rfl:   •  fluticasone (FLONASE) 50 MCG/ACT nasal spray, 2 sprays into the nostril(s) as directed by provider Daily., Disp: , Rfl:    •  oxymetazoline (AFRIN) 0.05 % nasal spray, 2 sprays into the nostril(s) as directed by provider As Needed., Disp: , Rfl:   •  sertraline (ZOLOFT) 25 MG tablet, Take 1 tablet by mouth Daily., Disp: 30 tablet, Rfl: 5     Review of Systems   Constitutional: Negative.    Respiratory: Negative.    Cardiovascular: Negative.  Negative for palpitations.   Musculoskeletal: Positive for arthralgias (left shoulder ).   Neurological: Positive for tingling (haydee at night in her hands). Negative for numbness.   Psychiatric/Behavioral: The patient is nervous/anxious (improving on medication).        Objective    Vitals:    06/23/20 1554   BP: 138/84   Pulse: 94   Temp: 97.8 °F (36.6 °C)   SpO2: 97%     Physical Exam   Constitutional: She is oriented to person, place, and time. She appears well-developed and well-nourished.   Cardiovascular: Normal rate, regular rhythm and normal heart sounds.   Pulmonary/Chest: Effort normal and breath sounds normal.   Musculoskeletal:        Left shoulder: She exhibits normal range of motion, no tenderness, no bony tenderness, no swelling, no effusion, no crepitus, no spasm and normal strength.   Neurological: She is alert and oriented to person, place, and time.   Skin: Skin is warm and dry.   Psychiatric:   No acute distress   Vitals reviewed.        Assessment/Plan   Jie was seen today for shoulder pain and anxiety.    Diagnoses and all orders for this visit:    Chronic left shoulder pain  -     XR Shoulder 2+ View Left  -     Ambulatory Referral to Orthopedic Surgery    Anxiety     Ms. Trejo anxiety is well controlled on the Zoloft 25 mg and she should continue the medication as prescribed.   I have ordered and reviewed the x-ray of her left shoulder. I do not see any acute abnormality. I have no other films to compare this to. I will refer her to orthopedic surgery for further evaluation.   She is to follow up with me as needed and at her next scheduled appointment.

## 2020-08-03 ENCOUNTER — OFFICE VISIT (OUTPATIENT)
Dept: ORTHOPEDIC SURGERY | Facility: CLINIC | Age: 55
End: 2020-08-03

## 2020-08-03 VITALS — WEIGHT: 227 LBS | TEMPERATURE: 98.3 F | HEIGHT: 64 IN | BODY MASS INDEX: 38.76 KG/M2

## 2020-08-03 DIAGNOSIS — M25.512 CHRONIC LEFT SHOULDER PAIN: Primary | ICD-10-CM

## 2020-08-03 DIAGNOSIS — G89.29 CHRONIC LEFT SHOULDER PAIN: Primary | ICD-10-CM

## 2020-08-03 PROCEDURE — 99214 OFFICE O/P EST MOD 30 MIN: CPT | Performed by: NURSE PRACTITIONER

## 2020-08-03 RX ORDER — MELOXICAM 15 MG/1
15 TABLET ORAL DAILY
Qty: 30 TABLET | Refills: 1 | Status: SHIPPED | OUTPATIENT
Start: 2020-08-03 | End: 2020-09-23

## 2020-08-03 NOTE — PROGRESS NOTES
"  Patient: Jie Gold    YOB: 1965    Medical Record Number: [id number]    Chief Complaints:   Left shoulder pain    History of Present Illness:     55 y.o. female patient who presents with a complaint of left shoulder pain.  She reports that the symptoms first started more than 1 year ago.  Reports she felt a \"pop\" when bearing weight on her left hand while making her evon size bed.  Pain is generalized about the shoulder.  Reports her pain ranges from mild to severe, but constantly aches with intermittent grinding and stabbing.  She gestures towards the back of her shoulder as she reports her shoulder \"pops\" every time she gets out of her recliner.  She leans on her left arm throughout the day while working at her computer which she feels may be one reason for her continued pain.  The pain is worse with certain lifting movements and pushing out to the side.  She has tried ibuprofen, Tylenol, and icy hot which provide temporary relief.  Reports pain intermittently radiates from shoulder to upper arm and shoulder to neck.  She has intermittent numbness in her left hand and all fingers which resolves after several repetitions of opening and closing her hand.    Allergies:   Allergies   Allergen Reactions   • Penicillins Rash     Medications:   Home Medications:    Current Outpatient Medications:   •  sertraline (ZOLOFT) 25 MG tablet, Take 1 tablet by mouth Daily., Disp: 30 tablet, Rfl: 5  •  acetaminophen (TYLENOL) 325 MG tablet, Take 650 mg by mouth Every 6 (Six) Hours As Needed for Mild Pain , Moderate Pain , Headache or Fever., Disp: , Rfl:   •  fluticasone (FLONASE) 50 MCG/ACT nasal spray, 2 sprays into the nostril(s) as directed by provider Daily., Disp: , Rfl:   •  oxymetazoline (AFRIN) 0.05 % nasal spray, 2 sprays into the nostril(s) as directed by provider As Needed., Disp: , Rfl:     History reviewed. No pertinent past medical history.    History reviewed. No pertinent surgical " "history.    Social History     Occupational History   • Not on file   Tobacco Use   • Smoking status: Former Smoker     Years: 30.00     Types: Cigarettes   • Smokeless tobacco: Never Used   Substance and Sexual Activity   • Alcohol use: No   • Drug use: No   • Sexual activity: Defer      Social History     Social History Narrative   • Not on file       Family History   Problem Relation Age of Onset   • Hypertension Father    • Hypertension Sister    • Hypertension Brother    • Diabetes Maternal Grandfather    • Prostate cancer Maternal Grandfather    • Hypertension Brother        Review of Systems:      Constitutional: Denies fever, shaking or chills   Eyes: Denies change in visual acuity   HEENT: Denies nasal congestion or sore throat   Respiratory: Denies cough or shortness of breath   Cardiovascular: Denies chest pain or edema  Endocrine: Denies tremors, palpitations, intolerance of heat or cold, polyuria, polydipsia.  GI: Denies abdominal pain, nausea, vomiting, bloody stools or diarrhea  : Denies frequency, urgency, incontinence, retention, or nocturia.  Musculoskeletal: Denies numbness, tingling or loss of motor function except as above  Integument: Denies rash, lesion or ulceration   Neurologic: Denies headache or focal weakness, deficits  Heme: Denies spontaneous or excessive bleeding, epistaxis, hematuria, melena, fatigue, enlarged or tender lymph nodes.      All other pertinent positives and negatives as noted above in HPI.    Physical Exam:   55 y.o. female  Vitals:    08/03/20 1051   Temp: 98.3 °F (36.8 °C)   Weight: 103 kg (227 lb)   Height: 162.6 cm (64\")     General:  Patient is awake and alert.  Appears in no acute distress or discomfort.    Psych:  Affect and demeanor are appropriate.    Eyes:  Conjunctiva and sclera appear grossly normal.  Eyes track well and EOM seem to be intact.    Ears:  No gross abnormalities.  Hearing adequate for the exam.    Dentition:  No gross abnormalities " noted.    Cardiovascular:  Regular rate and rhythm.    Lungs:  Good chest expansion.  Breathing unlabored.    Lymph:  No palpable adenopathy about neck or axilla.    Neck:  Supple.  Normal ROM.  Negative Spurling's for shoulder or arm pain or hand numbness.    Left shoulder is examined.  Skin is benign.  No gross abnormalities on inspection including any atrophy, swellings, or masses.  No palpable masses or adenopathy.  Mild focal tenderness over the upper trapezius.  Full shoulder motion.  No evident instability or apprehension.  Negative Neer, Empty can, Palafox manuevers.  Negative [facility].  Negative Speed's and Yergason's.  Good strength in the rotator cuff, deltoid, biceps, triceps, and .  Intact sensation throughout the arm.  Palpable radial pulse.  Good skin turgor.  Brisk cap refill.         Radiology:  Dr. Patrick and I reviewed the x-rays together.  Outside AP (internal and external views) of the left shoulder are reviewed to evaluate the patient's complaint.  No comparison films are immediately available.  The x-rays show mild acromioclavicular joint degeneration.  No obvious acute abnormalities, lesions, masses, significant glenohumeral degenerative changes, or other concerning findings.  The acromiohumeral interval appears normal.      Assessment/Plan:   Chronic left shoulder pain     Her exam is relatively benign.  I could not reproduce the popping or left hand numbness as she described.  Her pain seems rather generalized about the shoulder.  If she has a tear of either the rotator cuff or labrum, I suspect it is small.  I explained this in detail and further explained the natural progression of tears.  We discussed treatment options in detail.  I have recommended that we start with a conservative approach.  With regards to conservative options, we discussed appropriate activity modifications, icing as needed, anti-inflammatories, physical therapy, and injections.   Additionally, we discussed the  importance of maintaining good posture. I offered physical therapy, but she declined for now.  I demonstrated some stretching exercises.  I have given her a prescription for meloxicam.  The risks and dosing instructions for this medication were discussed.  She acknowledged understanding and will discontinue use of all other anti-inflammatories.  She may use ice and rest the shoulder as needed.  I recommended getting a MRI for further evaluation, but she would like to first see if the conservative interventions help.  She will follow up with me in 4 weeks.         LESTER Gutierrez    08/03/2020    CC to Linda Bro APRN

## 2020-08-31 ENCOUNTER — OFFICE VISIT (OUTPATIENT)
Dept: ORTHOPEDIC SURGERY | Facility: CLINIC | Age: 55
End: 2020-08-31

## 2020-08-31 VITALS — WEIGHT: 227 LBS | BODY MASS INDEX: 38.76 KG/M2 | TEMPERATURE: 97.1 F | HEIGHT: 64 IN

## 2020-08-31 DIAGNOSIS — M25.512 CHRONIC LEFT SHOULDER PAIN: Primary | ICD-10-CM

## 2020-08-31 DIAGNOSIS — G89.29 CHRONIC LEFT SHOULDER PAIN: Primary | ICD-10-CM

## 2020-08-31 PROCEDURE — 99212 OFFICE O/P EST SF 10 MIN: CPT | Performed by: NURSE PRACTITIONER

## 2020-08-31 NOTE — PROGRESS NOTES
"Chief Complaint:  Follow up left shoulder pain    HPI:  Patient returns to clinic today for left shoulder follow-up.  Reports her pain is slightly better with use of meloxicam.  Localizes the majority of her pain to the anterior and superior region of the shoulder.  She continues to complain of frequent mechanical popping in the shoulder that is occasionally painful.  Reports this popping occurs every time she is getting out of her recliner.  Patient is right-hand dominant.    Vitals:    08/31/20 1114   Temp: 97.1 °F (36.2 °C)   Weight: 103 kg (227 lb)   Height: 162.6 cm (64\")     Exam:  Left shoulder:  Skin is benign.  No gross abnormalities on inspection.  No palpable masses or adenopathy.  No effusion.  Mild tenderness to palpation over the anterior and posterior regions of the shoulder.  Crepitus with ROM.  Full symmetric motion.  No instability.  Negative Neer, empty can, Palafox.  Mildly positive O'Briens maneuver.  Good strength with forward flexion, resisted internal and external rotation, and resisted abduction.  Sensation is intact.  Brisk capillary refill in the fingers with good skin turgor.    Imaging:  None taken    Assessment:  Chronic left shoulder pain, suspect rotator cuff tear versus labral tear    Plan:  Even though her pain is somewhat better with taking meloxicam, her pain and mechanical symptoms have persisted for more than a year.  I explained that if she has a rotator cuff tear or a labral tear, I expect this to be small.  We discussed the natural progression of tears.  I recommended getting a MR arthrogram for further evaluation.  Patient agreed.  I will call her with the results and come up with a plan at that time.  She acknowledged understanding of all we discussed.    Imelda Noel, APRN  08/31/2020    "

## 2020-09-10 ENCOUNTER — HOSPITAL ENCOUNTER (OUTPATIENT)
Dept: MRI IMAGING | Facility: HOSPITAL | Age: 55
Discharge: HOME | End: 2020-09-10

## 2020-09-10 ENCOUNTER — HOSPITAL ENCOUNTER (OUTPATIENT)
Dept: GENERAL RADIOLOGY | Facility: HOSPITAL | Age: 55
Discharge: HOME | End: 2020-09-10

## 2020-09-10 DIAGNOSIS — M25.512 CHRONIC LEFT SHOULDER PAIN: ICD-10-CM

## 2020-09-10 DIAGNOSIS — G89.29 CHRONIC LEFT SHOULDER PAIN: ICD-10-CM

## 2020-09-10 PROCEDURE — 77002 NEEDLE LOCALIZATION BY XRAY: CPT

## 2020-09-10 PROCEDURE — A9577 INJ MULTIHANCE: HCPCS | Performed by: RADIOLOGY

## 2020-09-10 PROCEDURE — 73222 MRI JOINT UPR EXTREM W/DYE: CPT

## 2020-09-10 PROCEDURE — 25010000002 IOPAMIDOL 61 % SOLUTION: Performed by: RADIOLOGY

## 2020-09-10 PROCEDURE — 25010000003 LIDOCAINE 1 % SOLUTION: Performed by: RADIOLOGY

## 2020-09-10 PROCEDURE — 0 GADOBENATE DIMEGLUMINE 529 MG/ML SOLUTION: Performed by: RADIOLOGY

## 2020-09-10 RX ORDER — LIDOCAINE HYDROCHLORIDE 10 MG/ML
10 INJECTION, SOLUTION INFILTRATION; PERINEURAL ONCE
Status: COMPLETED | OUTPATIENT
Start: 2020-09-10 | End: 2020-09-10

## 2020-09-10 RX ADMIN — IOPAMIDOL 2 ML: 612 INJECTION, SOLUTION INTRAVENOUS at 13:53

## 2020-09-10 RX ADMIN — LIDOCAINE HYDROCHLORIDE 2 ML: 10 INJECTION, SOLUTION INFILTRATION; PERINEURAL at 13:53

## 2020-09-14 ENCOUNTER — TELEPHONE (OUTPATIENT)
Dept: ORTHOPEDIC SURGERY | Facility: CLINIC | Age: 55
End: 2020-09-14

## 2020-09-14 NOTE — TELEPHONE ENCOUNTER
I spoke to Ms. Aaron.  I provided her with the left shoulder MR arthrogram results.  She has some acromioclavicular joint arthritis, rotator cuff tendinopathy, and small labral tear.  I recommended she consider physical therapy and/or an injection.  She will consider both of these options and call me when she is ready to proceed.

## 2020-09-23 DIAGNOSIS — M25.512 CHRONIC LEFT SHOULDER PAIN: ICD-10-CM

## 2020-09-23 DIAGNOSIS — G89.29 CHRONIC LEFT SHOULDER PAIN: ICD-10-CM

## 2020-09-23 RX ORDER — MELOXICAM 15 MG/1
TABLET ORAL
Qty: 30 TABLET | Refills: 1 | Status: SHIPPED | OUTPATIENT
Start: 2020-09-23 | End: 2020-10-21 | Stop reason: SDUPTHER

## 2020-10-20 NOTE — PROGRESS NOTES
Ms. Gold returns to clinic for a left shoulder steroid injection as recommended during our last telephone conversation.  The risks, benefits, and alternatives to an injection were discussed.  She consented to proceed.  The injection was performed as noted below.  Additionally, I agreed to refill her meloxicam.  The risks of this medication were dicussed.      Imelda Noel, LESTER    10/21/2020    Large Joint Arthrocentesis: L subacromial bursa  Date/Time: 10/21/2020 10:30 AM  Consent given by: patient  Site marked: site marked  Timeout: Immediately prior to procedure a time out was called to verify the correct patient, procedure, equipment, support staff and site/side marked as required   Supporting Documentation  Indications: pain   Procedure Details  Location: shoulder - L subacromial bursa  Preparation: Patient was prepped and draped in the usual sterile fashion  Needle gauge: 21 G.  Approach: posterior  Medications administered: 2 mL lidocaine (cardiac); 80 mg methylPREDNISolone acetate 80 MG/ML  Patient tolerance: patient tolerated the procedure well with no immediate complications

## 2020-10-21 ENCOUNTER — CLINICAL SUPPORT (OUTPATIENT)
Dept: ORTHOPEDIC SURGERY | Facility: CLINIC | Age: 55
End: 2020-10-21

## 2020-10-21 VITALS — TEMPERATURE: 96 F | WEIGHT: 221 LBS | HEIGHT: 64 IN | BODY MASS INDEX: 37.73 KG/M2

## 2020-10-21 DIAGNOSIS — M25.512 CHRONIC LEFT SHOULDER PAIN: Primary | ICD-10-CM

## 2020-10-21 DIAGNOSIS — G89.29 CHRONIC LEFT SHOULDER PAIN: Primary | ICD-10-CM

## 2020-10-21 DIAGNOSIS — F41.9 ANXIETY: ICD-10-CM

## 2020-10-21 PROCEDURE — 20610 DRAIN/INJ JOINT/BURSA W/O US: CPT | Performed by: NURSE PRACTITIONER

## 2020-10-21 RX ORDER — MELOXICAM 15 MG/1
15 TABLET ORAL DAILY
Qty: 30 TABLET | Refills: 1 | Status: SHIPPED | OUTPATIENT
Start: 2020-10-21 | End: 2020-12-28

## 2020-10-21 RX ORDER — SERTRALINE HYDROCHLORIDE 25 MG/1
25 TABLET, FILM COATED ORAL DAILY
Qty: 30 TABLET | Refills: 0 | Status: SHIPPED | OUTPATIENT
Start: 2020-10-21 | End: 2020-11-19

## 2020-10-21 RX ORDER — METHYLPREDNISOLONE ACETATE 80 MG/ML
80 INJECTION, SUSPENSION INTRA-ARTICULAR; INTRALESIONAL; INTRAMUSCULAR; SOFT TISSUE
Status: COMPLETED | OUTPATIENT
Start: 2020-10-21 | End: 2020-10-21

## 2020-10-21 RX ADMIN — METHYLPREDNISOLONE ACETATE 80 MG: 80 INJECTION, SUSPENSION INTRA-ARTICULAR; INTRALESIONAL; INTRAMUSCULAR; SOFT TISSUE at 10:30

## 2020-11-05 ENCOUNTER — TELEPHONE (OUTPATIENT)
Dept: FAMILY MEDICINE CLINIC | Facility: CLINIC | Age: 55
End: 2020-11-05

## 2020-11-05 NOTE — TELEPHONE ENCOUNTER
MS. GOLD SAYS THAT SHE IS NEEDING A NOTE STATING WHAT SHE SHOULD DO IF SHE HAS BEEN EXPOSED TO 2 [employer]- WORKERS LAST WEEK THAT HAS TESTED POSITIVE, SHE WAS TESTED TODAY. SHE IS NEEDING A LETTER STATING WHAT SHE SHOULD HAVE TO DO (PER HER EMPLOYER). SHE IS CURRENTLY BEING QUARANTINED UNTIL 11/26/2020.    Jie Gold (Self) [phone]

## 2020-11-05 NOTE — TELEPHONE ENCOUNTER
S/w patient- testing done at [facility]. Scheduled mychart video visit Monday for eval and workmate.

## 2020-11-19 DIAGNOSIS — F41.9 ANXIETY: ICD-10-CM

## 2020-11-19 RX ORDER — SERTRALINE HYDROCHLORIDE 25 MG/1
25 TABLET, FILM COATED ORAL DAILY
Qty: 30 TABLET | Refills: 0 | Status: SHIPPED | OUTPATIENT
Start: 2020-11-19 | End: 2020-12-17

## 2020-11-20 ENCOUNTER — OFFICE VISIT (OUTPATIENT)
Dept: FAMILY MEDICINE CLINIC | Facility: CLINIC | Age: 55
End: 2020-11-20

## 2020-11-20 VITALS
WEIGHT: 225 LBS | HEART RATE: 79 BPM | OXYGEN SATURATION: 98 % | HEIGHT: 64 IN | DIASTOLIC BLOOD PRESSURE: 82 MMHG | TEMPERATURE: 96.9 F | SYSTOLIC BLOOD PRESSURE: 148 MMHG | BODY MASS INDEX: 38.41 KG/M2

## 2020-11-20 DIAGNOSIS — M54.42 LEFT-SIDED LOW BACK PAIN WITH LEFT-SIDED SCIATICA, UNSPECIFIED CHRONICITY: Primary | ICD-10-CM

## 2020-11-20 PROCEDURE — 99213 OFFICE O/P EST LOW 20 MIN: CPT | Performed by: NURSE PRACTITIONER

## 2020-11-20 RX ORDER — IBUPROFEN 800 MG/1
800 TABLET ORAL EVERY 6 HOURS PRN
Qty: 120 TABLET | Refills: 1 | Status: SHIPPED | OUTPATIENT
Start: 2020-11-20

## 2020-11-20 RX ORDER — TIZANIDINE HYDROCHLORIDE 4 MG/1
4 CAPSULE, GELATIN COATED ORAL 3 TIMES DAILY
Qty: 42 CAPSULE | Refills: 1 | Status: SHIPPED | OUTPATIENT
Start: 2020-11-20 | End: 2022-06-06

## 2020-11-20 NOTE — PROGRESS NOTES
Subjective   Jie Gold is a 55 y.o. female.     Chief Complaint   Patient presents with   • Leg Pain     RT & RT hip   • Back Pain     RT side     Ms. Gold presents today with complaints of worsening back pain over the past 2 weeks. It started with sciatic pain in her left thigh. The only relief she gets is if she is sitting straight in a chair. She did take Tylenol and used therageic cream last night which relieved the pain long enough for her to sleep. She does report having chronic frequent back spasms.      I have reviewed the patient's medical history in detail and updated the computerized patient record.    The following portions of the patient's history were reviewed and updated as appropriate: allergies, current medications, past family history, past medical history, past social history, past surgical history and problem list.      Current Outpatient Medications:   •  acetaminophen (TYLENOL) 325 MG tablet, Take 650 mg by mouth Every 6 (Six) Hours As Needed for Mild Pain , Moderate Pain , Headache or Fever., Disp: , Rfl:   •  fluticasone (FLONASE) 50 MCG/ACT nasal spray, 2 sprays into the nostril(s) as directed by provider Daily., Disp: , Rfl:   •  meloxicam (MOBIC) 15 MG tablet, Take 1 tablet by mouth Daily., Disp: 30 tablet, Rfl: 1  •  sertraline (ZOLOFT) 25 MG tablet, Take 1 tablet by mouth Daily., Disp: 30 tablet, Rfl: 0     Review of Systems   Constitutional: Negative.    Respiratory: Negative.    Cardiovascular: Negative.    Musculoskeletal: Positive for back pain and myalgias.        Muscle spasms in back, back pain in left lower back    Neurological: Negative for dizziness and numbness.   Psychiatric/Behavioral: Negative.        Objective    Vitals:    11/20/20 1046   BP: 148/82   Pulse: 79   Temp: 96.9 °F (36.1 °C)   SpO2: 98%     Body mass index is 38.62 kg/m².     Physical Exam  Constitutional:       Appearance: Normal appearance.   Cardiovascular:      Rate and Rhythm: Normal rate and  regular rhythm.      Pulses: Normal pulses.      Heart sounds: Normal heart sounds.   Pulmonary:      Effort: Pulmonary effort is normal.      Breath sounds: Normal breath sounds.   Musculoskeletal:      Lumbar back: She exhibits tenderness (located to the left side).   Skin:     General: Skin is warm and dry.   Neurological:      Mental Status: She is alert and oriented to person, place, and time.   Psychiatric:      Comments: No acute distress           Assessment/Plan   Diagnoses and all orders for this visit:    1. Left-sided low back pain with left-sided sciatica, unspecified chronicity (Primary)    Other orders  -     TiZANidine (ZANAFLEX) 4 MG capsule; Take 1 capsule by mouth 3 (Three) Times a Day for 14 days.  Dispense: 42 capsule; Refill: 1  -     ibuprofen (ADVIL,MOTRIN) 800 MG tablet; Take 1 tablet by mouth Every 6 (Six) Hours As Needed for Mild Pain .  Dispense: 120 tablet; Refill: 1      Ms. Gold presents today with complaints of lower left side back pain. She is also having muscle spasms.   She is to stop taking Mobic for now. Start Ibuprofen 800 mg four times a day as needed for pain. She is to start Tizanidine 4 mg three times a day as needed for muscle spasms.   She is needing to have a Covid test to accompany her handicap daughter while her daughter has an EGD in January. I have asked Ms. Gold to call the last week of December so I can order the test for her.   Follow up at her next scheduled appointment.

## 2020-12-17 DIAGNOSIS — F41.9 ANXIETY: ICD-10-CM

## 2020-12-17 RX ORDER — SERTRALINE HYDROCHLORIDE 25 MG/1
25 TABLET, FILM COATED ORAL DAILY
Qty: 30 TABLET | Refills: 0 | Status: SHIPPED | OUTPATIENT
Start: 2020-12-17 | End: 2021-01-14

## 2020-12-25 DIAGNOSIS — M25.512 CHRONIC LEFT SHOULDER PAIN: ICD-10-CM

## 2020-12-25 DIAGNOSIS — G89.29 CHRONIC LEFT SHOULDER PAIN: ICD-10-CM

## 2020-12-28 RX ORDER — MELOXICAM 15 MG/1
15 TABLET ORAL DAILY
Qty: 30 TABLET | Refills: 1 | Status: SHIPPED | OUTPATIENT
Start: 2020-12-28 | End: 2021-03-01

## 2021-01-14 DIAGNOSIS — F41.9 ANXIETY: ICD-10-CM

## 2021-01-14 RX ORDER — SERTRALINE HYDROCHLORIDE 25 MG/1
25 TABLET, FILM COATED ORAL DAILY
Qty: 30 TABLET | Refills: 0 | Status: SHIPPED | OUTPATIENT
Start: 2021-01-14 | End: 2021-02-18

## 2021-01-20 ENCOUNTER — OFFICE VISIT (OUTPATIENT)
Dept: ORTHOPEDIC SURGERY | Facility: CLINIC | Age: 56
End: 2021-01-20

## 2021-01-20 VITALS — WEIGHT: 228 LBS | HEIGHT: 64 IN | BODY MASS INDEX: 38.93 KG/M2 | TEMPERATURE: 97.2 F

## 2021-01-20 DIAGNOSIS — G89.29 CHRONIC LEFT SHOULDER PAIN: Primary | ICD-10-CM

## 2021-01-20 DIAGNOSIS — M25.512 CHRONIC LEFT SHOULDER PAIN: Primary | ICD-10-CM

## 2021-01-20 PROCEDURE — 20610 DRAIN/INJ JOINT/BURSA W/O US: CPT | Performed by: NURSE PRACTITIONER

## 2021-01-20 RX ORDER — METHYLPREDNISOLONE ACETATE 80 MG/ML
80 INJECTION, SUSPENSION INTRA-ARTICULAR; INTRALESIONAL; INTRAMUSCULAR; SOFT TISSUE
Status: COMPLETED | OUTPATIENT
Start: 2021-01-20 | End: 2021-01-20

## 2021-01-20 RX ADMIN — METHYLPREDNISOLONE ACETATE 80 MG: 80 INJECTION, SUSPENSION INTRA-ARTICULAR; INTRALESIONAL; INTRAMUSCULAR; SOFT TISSUE at 11:20

## 2021-01-20 NOTE — PROGRESS NOTES
Ms. Gold comes in today for follow-up.  Reports the injection she received in October helped tremendously for the first two months.  The patient would like to get a repeat injection today.  The risks, benefits and alternatives were discussed and the patient consented.  Going forward, the patient will follow-up as needed.    Imelda Noel, LESTER    01/20/2021      Large Joint Arthrocentesis: L subacromial bursa  Date/Time: 1/20/2021 11:20 AM  Consent given by: patient  Site marked: site marked  Timeout: Immediately prior to procedure a time out was called to verify the correct patient, procedure, equipment, support staff and site/side marked as required   Supporting Documentation  Indications: pain   Procedure Details  Location: shoulder - L subacromial bursa  Preparation: Patient was prepped and draped in the usual sterile fashion  Needle gauge: 21G.  Approach: posterior  Medications administered: 2 mL lidocaine (cardiac); 80 mg methylPREDNISolone acetate 80 MG/ML  Patient tolerance: patient tolerated the procedure well with no immediate complications

## 2021-02-18 DIAGNOSIS — F41.9 ANXIETY: ICD-10-CM

## 2021-02-18 RX ORDER — SERTRALINE HYDROCHLORIDE 25 MG/1
25 TABLET, FILM COATED ORAL DAILY
Qty: 90 TABLET | Refills: 1 | Status: SHIPPED | OUTPATIENT
Start: 2021-02-18 | End: 2021-06-02 | Stop reason: DRUGHIGH

## 2021-02-28 DIAGNOSIS — M25.512 CHRONIC LEFT SHOULDER PAIN: ICD-10-CM

## 2021-02-28 DIAGNOSIS — G89.29 CHRONIC LEFT SHOULDER PAIN: ICD-10-CM

## 2021-03-01 RX ORDER — MELOXICAM 15 MG/1
15 TABLET ORAL DAILY
Qty: 30 TABLET | Refills: 1 | Status: SHIPPED | OUTPATIENT
Start: 2021-03-01 | End: 2021-05-10

## 2021-03-01 NOTE — TELEPHONE ENCOUNTER
Last appointment 01/20/21  Last refill 12/28/20     Patient in room PCU 3013. I have received report from  RONALDO Cristobal  and had the opportunity 
to ask questions and assume patient care.

## 2021-03-05 ENCOUNTER — OFFICE VISIT (OUTPATIENT)
Dept: FAMILY MEDICINE CLINIC | Facility: CLINIC | Age: 56
End: 2021-03-05

## 2021-03-05 VITALS
OXYGEN SATURATION: 98 % | HEART RATE: 79 BPM | HEIGHT: 64 IN | BODY MASS INDEX: 39.44 KG/M2 | DIASTOLIC BLOOD PRESSURE: 84 MMHG | SYSTOLIC BLOOD PRESSURE: 130 MMHG | WEIGHT: 231 LBS | TEMPERATURE: 96.8 F

## 2021-03-05 DIAGNOSIS — R53.83 FATIGUE, UNSPECIFIED TYPE: Primary | ICD-10-CM

## 2021-03-05 PROCEDURE — 99213 OFFICE O/P EST LOW 20 MIN: CPT | Performed by: NURSE PRACTITIONER

## 2021-03-05 NOTE — PROGRESS NOTES
"Chief Complaint  Thyroid Problem (recheck thyroid, anemia check (pt request)) and Fatigue    Subjective          Jie Gold presents to [facility]  Fatigue  This is a chronic problem. The current episode started more than 1 year ago. The problem occurs constantly. The problem has been unchanged. Associated symptoms include fatigue. Pertinent negatives include no anorexia, headaches, myalgias, numbness, vertigo or visual change. Associated symptoms comments: Always has a soft drink with her, nocturia. Nothing aggravates the symptoms. She has tried nothing for the symptoms. The treatment provided no relief.     I have reviewed the patient's medical history in detail and updated the computerized patient record.    Current Outpatient Medications:   •  acetaminophen (TYLENOL) 325 MG tablet, Take 650 mg by mouth Every 6 (Six) Hours As Needed for Mild Pain , Moderate Pain , Headache or Fever., Disp: , Rfl:   •  fluticasone (FLONASE) 50 MCG/ACT nasal spray, 2 sprays into the nostril(s) as directed by provider Daily., Disp: , Rfl:   •  ibuprofen (ADVIL,MOTRIN) 800 MG tablet, Take 1 tablet by mouth Every 6 (Six) Hours As Needed for Mild Pain ., Disp: 120 tablet, Rfl: 1  •  meloxicam (MOBIC) 15 MG tablet, Take 1 tablet by mouth Daily., Disp: 30 tablet, Rfl: 1  •  sertraline (ZOLOFT) 25 MG tablet, Take 1 tablet by mouth Daily., Disp: 90 tablet, Rfl: 1     Review of Systems   Constitutional: Positive for fatigue.   Gastrointestinal: Negative for anorexia.   Musculoskeletal: Negative for myalgias.   Skin:        No hair loss, does have dry skin   Neurological: Negative.  Negative for vertigo and numbness.     Objective   Vital Signs:   /84 (BP Location: Left arm, Patient Position: Sitting, Cuff Size: Adult)   Pulse 79   Temp 96.8 °F (36 °C) (Infrared)   Ht 162.6 cm (64\")   Wt 105 kg (231 lb)   SpO2 98%   BMI 39.65 kg/m²     Physical Exam  Vitals signs reviewed.   Constitutional:      "  Appearance: Normal appearance.   Neck:      Musculoskeletal: Normal range of motion and neck supple.   Cardiovascular:      Rate and Rhythm: Normal rate and regular rhythm.   Pulmonary:      Effort: Pulmonary effort is normal.      Breath sounds: Normal breath sounds.   Skin:     General: Skin is warm and dry.   Neurological:      Mental Status: She is alert and oriented to person, place, and time.   Psychiatric:      Comments: No acute distress        Result Review :                 Assessment and Plan    Diagnoses and all orders for this visit:    1. Fatigue, unspecified type (Primary)  -     Ferritin; Future  -     Iron Profile; Future  -     Vitamin D 25 Hydroxy; Future  -     Vitamin B12; Future  -     Folate; Future  -     CBC (No Diff); Future  -     Hemoglobin A1c; Future    Ms. Gold presents with chronic fatigue. This is not a new problem to her. I have worked her up a year ago for this same issue. All of her labs were normal then.   I have ordered the above labs to be done next week.   I will call her with the results once I have reviewed them.     Follow Up   No follow-ups on file.  Patient was given instructions and counseling regarding her condition or for health maintenance advice. Please see specific information pulled into the AVS if appropriate.

## 2021-03-17 ENCOUNTER — TELEPHONE (OUTPATIENT)
Dept: FAMILY MEDICINE CLINIC | Facility: CLINIC | Age: 56
End: 2021-03-17

## 2021-03-17 NOTE — TELEPHONE ENCOUNTER
S/w patient- read letter and advised of new medication sent to [facility]. She voiced understanding. I asked that she call if she has anymore questions.

## 2021-03-30 ENCOUNTER — BULK ORDERING (OUTPATIENT)
Dept: CASE MANAGEMENT | Facility: OTHER | Age: 56
End: 2021-03-30

## 2021-03-30 DIAGNOSIS — Z23 IMMUNIZATION DUE: ICD-10-CM

## 2021-03-31 ENCOUNTER — TELEPHONE (OUTPATIENT)
Dept: FAMILY MEDICINE CLINIC | Facility: CLINIC | Age: 56
End: 2021-03-31

## 2021-03-31 NOTE — TELEPHONE ENCOUNTER
Patient called wanting advisement regarding getting her COVID vaccine after catalino COVID. (She has recovered from the acute phase and her isolation has been lifted.)    I read this to her and the info is located on the CDC website:    Vaccination of persons with known current SARS-CoV-2 infection should be deferred until the person has recovered from the acute illness (if the person had symptoms) and criteria have been met for them to discontinue isolation. This recommendation applies to persons who develop SARS-CoV-2 infection before receiving any vaccine doses as well as those who develop SARS-CoV-2 infection after the first dose but before receipt of the second dose.        She voiced understanding and is scheduled for the [facility] immunization on Friday.

## 2021-05-08 DIAGNOSIS — M25.512 CHRONIC LEFT SHOULDER PAIN: ICD-10-CM

## 2021-05-08 DIAGNOSIS — G89.29 CHRONIC LEFT SHOULDER PAIN: ICD-10-CM

## 2021-05-10 RX ORDER — MELOXICAM 15 MG/1
15 TABLET ORAL DAILY
Qty: 30 TABLET | Refills: 1 | Status: SHIPPED | OUTPATIENT
Start: 2021-05-10 | End: 2021-07-12

## 2021-06-02 ENCOUNTER — OFFICE VISIT (OUTPATIENT)
Dept: FAMILY MEDICINE CLINIC | Facility: CLINIC | Age: 56
End: 2021-06-02

## 2021-06-02 VITALS
HEART RATE: 76 BPM | OXYGEN SATURATION: 98 % | BODY MASS INDEX: 39.44 KG/M2 | HEIGHT: 64 IN | WEIGHT: 231 LBS | SYSTOLIC BLOOD PRESSURE: 124 MMHG | DIASTOLIC BLOOD PRESSURE: 78 MMHG

## 2021-06-02 DIAGNOSIS — F41.9 ANXIETY: Primary | ICD-10-CM

## 2021-06-02 PROCEDURE — 99214 OFFICE O/P EST MOD 30 MIN: CPT | Performed by: NURSE PRACTITIONER

## 2021-06-02 RX ORDER — CHOLECALCIFEROL (VITAMIN D3) 1250 MCG
50000 CAPSULE ORAL 2 TIMES WEEKLY
COMMUNITY
Start: 2021-05-11 | End: 2021-08-30

## 2021-06-02 NOTE — PROGRESS NOTES
"Chief Complaint  Med Refill    Subjective          Jie Gold presents to [facility]  Ms. Gold presents today to discuss her anxiety and get medication refills. She states she feels that the Sertraline is no longer working as well for her lately. She feels that her mind will not turn off at night and she is having difficulty concentrating. She also feels anxious at times.       I have reviewed the patient's medical history in detail and updated the computerized patient record.      Current Outpatient Medications:   •  acetaminophen (TYLENOL) 325 MG tablet, Take 650 mg by mouth Every 6 (Six) Hours As Needed for Mild Pain , Moderate Pain , Headache or Fever., Disp: , Rfl:   •  Cholecalciferol (Vitamin D3) 1.25 MG (12363 UT) capsule, Take 50,000 Units by mouth 2 (Two) Times a Week., Disp: , Rfl:   •  fluticasone (FLONASE) 50 MCG/ACT nasal spray, 2 sprays into the nostril(s) as directed by provider Daily., Disp: , Rfl:   •  ibuprofen (ADVIL,MOTRIN) 800 MG tablet, Take 1 tablet by mouth Every 6 (Six) Hours As Needed for Mild Pain ., Disp: 120 tablet, Rfl: 1  •  meloxicam (MOBIC) 15 MG tablet, Take 1 tablet by mouth Daily., Disp: 30 tablet, Rfl: 1  •  sertraline (Zoloft) 50 MG tablet, Take 1 tablet by mouth Daily., Disp: 90 tablet, Rfl: 1     Objective   Vital Signs:   /78 (BP Location: Right arm, Patient Position: Sitting, Cuff Size: Adult)   Pulse 76   Ht 162.6 cm (64\")   Wt 105 kg (231 lb)   SpO2 98%   BMI 39.65 kg/m²       Physical Exam  Vitals reviewed.   Constitutional:       Appearance: Normal appearance. She is obese.   Cardiovascular:      Rate and Rhythm: Normal rate and regular rhythm.      Pulses: Normal pulses.      Heart sounds: Normal heart sounds.   Pulmonary:      Effort: Pulmonary effort is normal.      Breath sounds: Normal breath sounds.   Skin:     General: Skin is warm and dry.   Neurological:      Mental Status: She is alert and oriented to person, " place, and time.   Psychiatric:         Mood and Affect: Mood normal.         Behavior: Behavior normal.         Thought Content: Thought content normal.         Judgment: Judgment normal.        Result Review :                 Assessment and Plan    Diagnoses and all orders for this visit:    1. Anxiety (Primary)  -     sertraline (Zoloft) 50 MG tablet; Take 1 tablet by mouth Daily.  Dispense: 90 tablet; Refill: 1    We discussed her anxiety. She is to increase the Sertraline from 25 mg daily to 50 mg daily.   She is to follow in 4 months.       Follow Up   Return in about 4 months (around 10/2/2021), or if symptoms worsen or fail to improve, for non-fasting labs the week before, vitamin D level.     Patient was given instructions and counseling regarding her condition or for health maintenance advice. Please see specific information pulled into the AVS if appropriate.

## 2021-06-09 ENCOUNTER — OFFICE VISIT (OUTPATIENT)
Dept: FAMILY MEDICINE CLINIC | Facility: CLINIC | Age: 56
End: 2021-06-09

## 2021-06-09 VITALS
DIASTOLIC BLOOD PRESSURE: 84 MMHG | HEART RATE: 89 BPM | HEIGHT: 64 IN | SYSTOLIC BLOOD PRESSURE: 136 MMHG | OXYGEN SATURATION: 96 % | BODY MASS INDEX: 39.61 KG/M2 | WEIGHT: 232 LBS

## 2021-06-09 DIAGNOSIS — E66.01 MORBID (SEVERE) OBESITY DUE TO EXCESS CALORIES (HCC): Primary | ICD-10-CM

## 2021-06-09 PROCEDURE — 99214 OFFICE O/P EST MOD 30 MIN: CPT | Performed by: NURSE PRACTITIONER

## 2021-06-09 RX ORDER — BUPROPION HYDROCHLORIDE 150 MG/1
TABLET, EXTENDED RELEASE ORAL
Qty: 60 TABLET | Refills: 5 | Status: SHIPPED | OUTPATIENT
Start: 2021-06-09 | End: 2022-05-20 | Stop reason: SDUPTHER

## 2021-06-09 NOTE — PROGRESS NOTES
"Chief Complaint  Obesity (discuss weight loss medication)    Subjective          Jie Gold presents to [facility]  Obesity  This is a chronic problem. The current episode started more than 1 year ago. The problem occurs constantly. Associated symptoms include fatigue and myalgias. Associated symptoms comments: Shortness of breath at times, no energy, knee pain. Nothing aggravates the symptoms. Treatments tried: wieght watchers x 3, OTC weight loss products. The treatment provided no relief.     I have reviewed the patient's medical history in detail and updated the computerized patient record.    Current Outpatient Medications:   •  acetaminophen (TYLENOL) 325 MG tablet, Take 650 mg by mouth Every 6 (Six) Hours As Needed for Mild Pain , Moderate Pain , Headache or Fever., Disp: , Rfl:   •  Cholecalciferol (Vitamin D3) 1.25 MG (67747 UT) capsule, Take 50,000 Units by mouth 2 (Two) Times a Week., Disp: , Rfl:   •  fluticasone (FLONASE) 50 MCG/ACT nasal spray, 2 sprays into the nostril(s) as directed by provider Daily., Disp: , Rfl:   •  ibuprofen (ADVIL,MOTRIN) 800 MG tablet, Take 1 tablet by mouth Every 6 (Six) Hours As Needed for Mild Pain ., Disp: 120 tablet, Rfl: 1  •  meloxicam (MOBIC) 15 MG tablet, Take 1 tablet by mouth Daily., Disp: 30 tablet, Rfl: 1  •  sertraline (Zoloft) 50 MG tablet, Take 1 tablet by mouth Daily., Disp: 90 tablet, Rfl: 1  •  buPROPion SR (Wellbutrin SR) 150 MG 12 hr tablet, Take 1 tab daily x 2 weeks then 2 tabs daily, Disp: 60 tablet, Rfl: 5     Objective   Vital Signs:   /84 (BP Location: Right arm, Patient Position: Sitting, Cuff Size: Adult)   Pulse 89   Ht 162.6 cm (64\")   Wt 105 kg (232 lb)   SpO2 96%   BMI 39.82 kg/m²       Physical Exam  Constitutional:       Appearance: She is obese.   Cardiovascular:      Rate and Rhythm: Normal rate and regular rhythm.      Pulses: Normal pulses.      Heart sounds: Normal heart sounds.   Pulmonary: "      Effort: Pulmonary effort is normal.      Breath sounds: Normal breath sounds.   Neurological:      Mental Status: She is alert and oriented to person, place, and time.   Psychiatric:      Comments: No acute distress        Result Review :                 Assessment and Plan    Diagnoses and all orders for this visit:    1. Morbid (severe) obesity due to excess calories (CMS/HCC) (Primary)    Other orders  -     buPROPion SR (Wellbutrin SR) 150 MG 12 hr tablet; Take 1 tab daily x 2 weeks then 2 tabs daily  Dispense: 60 tablet; Refill: 5    Ms. Gold presents to discuss weight loss medication option. After our last visit she called her insurance company. They will not pay for any weight loss medication.  I will use a combination of Wellbutrin with Prozac to help with weight loss for patient who's insurance will not pay for any weight loss medication. Since she is on Zoloft 50 mg daily I will add Wellbutrin  mg daily she is to increase the dose to 300 mg after two weeks.  She is to follow up in 3 months to assess her weight loss progress.     Follow Up {Instructions Charge Capture  Follow-up Communications :23}  Return in about 3 months (around 9/9/2021) for on weight loss progress.  Patient was given instructions and counseling regarding her condition or for health maintenance advice. Please see specific information pulled into the AVS if appropriate.

## 2021-07-11 DIAGNOSIS — G89.29 CHRONIC LEFT SHOULDER PAIN: ICD-10-CM

## 2021-07-11 DIAGNOSIS — M25.512 CHRONIC LEFT SHOULDER PAIN: ICD-10-CM

## 2021-07-12 RX ORDER — MELOXICAM 15 MG/1
15 TABLET ORAL DAILY
Qty: 30 TABLET | Refills: 1 | Status: SHIPPED | OUTPATIENT
Start: 2021-07-12 | End: 2021-10-04

## 2021-08-30 RX ORDER — CHOLECALCIFEROL (VITAMIN D3) 1250 MCG
CAPSULE ORAL
Qty: 8 CAPSULE | Refills: 5 | Status: SHIPPED | OUTPATIENT
Start: 2021-08-30

## 2021-09-14 DIAGNOSIS — E55.9 VITAMIN D DEFICIENCY: Primary | ICD-10-CM

## 2021-10-02 DIAGNOSIS — M25.512 CHRONIC LEFT SHOULDER PAIN: ICD-10-CM

## 2021-10-02 DIAGNOSIS — G89.29 CHRONIC LEFT SHOULDER PAIN: ICD-10-CM

## 2021-10-04 RX ORDER — MELOXICAM 15 MG/1
15 TABLET ORAL DAILY
Qty: 30 TABLET | Refills: 1 | Status: SHIPPED | OUTPATIENT
Start: 2021-10-04 | End: 2021-12-10

## 2021-12-01 DIAGNOSIS — F41.9 ANXIETY: ICD-10-CM

## 2021-12-10 DIAGNOSIS — G89.29 CHRONIC LEFT SHOULDER PAIN: ICD-10-CM

## 2021-12-10 DIAGNOSIS — M25.512 CHRONIC LEFT SHOULDER PAIN: ICD-10-CM

## 2021-12-10 RX ORDER — MELOXICAM 15 MG/1
15 TABLET ORAL DAILY
Qty: 30 TABLET | Refills: 1 | Status: SHIPPED | OUTPATIENT
Start: 2021-12-10 | End: 2022-02-07

## 2022-02-05 DIAGNOSIS — G89.29 CHRONIC LEFT SHOULDER PAIN: ICD-10-CM

## 2022-02-05 DIAGNOSIS — M25.512 CHRONIC LEFT SHOULDER PAIN: ICD-10-CM

## 2022-02-07 RX ORDER — MELOXICAM 15 MG/1
TABLET ORAL
Qty: 30 TABLET | Refills: 1 | Status: SHIPPED | OUTPATIENT
Start: 2022-02-07 | End: 2022-05-20 | Stop reason: SDUPTHER

## 2022-03-30 DIAGNOSIS — G89.29 CHRONIC LEFT SHOULDER PAIN: ICD-10-CM

## 2022-03-30 DIAGNOSIS — M25.512 CHRONIC LEFT SHOULDER PAIN: ICD-10-CM

## 2022-03-30 RX ORDER — MELOXICAM 15 MG/1
TABLET ORAL
Qty: 30 TABLET | Refills: 1 | OUTPATIENT
Start: 2022-03-30

## 2022-04-06 DIAGNOSIS — G89.29 CHRONIC LEFT SHOULDER PAIN: ICD-10-CM

## 2022-04-06 DIAGNOSIS — M25.512 CHRONIC LEFT SHOULDER PAIN: ICD-10-CM

## 2022-04-06 RX ORDER — MELOXICAM 15 MG/1
TABLET ORAL
Qty: 30 TABLET | Refills: 1 | OUTPATIENT
Start: 2022-04-06

## 2022-04-06 NOTE — TELEPHONE ENCOUNTER
Per [facility] note on 3/30 pt needs to  from her PCP if she wants to continue rx. Pt informed via VM.

## 2022-04-07 DIAGNOSIS — M25.512 CHRONIC LEFT SHOULDER PAIN: ICD-10-CM

## 2022-04-07 DIAGNOSIS — G89.29 CHRONIC LEFT SHOULDER PAIN: ICD-10-CM

## 2022-04-07 RX ORDER — MELOXICAM 15 MG/1
TABLET ORAL
Qty: 30 TABLET | Refills: 1 | OUTPATIENT
Start: 2022-04-07

## 2022-05-20 ENCOUNTER — OFFICE VISIT (OUTPATIENT)
Dept: FAMILY MEDICINE CLINIC | Facility: CLINIC | Age: 57
End: 2022-05-20

## 2022-05-20 VITALS
OXYGEN SATURATION: 97 % | DIASTOLIC BLOOD PRESSURE: 86 MMHG | HEIGHT: 64 IN | HEART RATE: 82 BPM | BODY MASS INDEX: 39.61 KG/M2 | WEIGHT: 232 LBS | SYSTOLIC BLOOD PRESSURE: 140 MMHG

## 2022-05-20 DIAGNOSIS — R73.01 IMPAIRED FASTING GLUCOSE: ICD-10-CM

## 2022-05-20 DIAGNOSIS — G89.29 CHRONIC LEFT SHOULDER PAIN: ICD-10-CM

## 2022-05-20 DIAGNOSIS — M25.512 CHRONIC LEFT SHOULDER PAIN: ICD-10-CM

## 2022-05-20 DIAGNOSIS — Z00.00 ANNUAL PHYSICAL EXAM: Primary | ICD-10-CM

## 2022-05-20 PROCEDURE — 99214 OFFICE O/P EST MOD 30 MIN: CPT | Performed by: NURSE PRACTITIONER

## 2022-05-20 RX ORDER — MELOXICAM 15 MG/1
15 TABLET ORAL DAILY
Qty: 30 TABLET | Refills: 0 | Status: SHIPPED | OUTPATIENT
Start: 2022-05-20 | End: 2022-06-27

## 2022-05-20 RX ORDER — BUPROPION HYDROCHLORIDE 150 MG/1
TABLET, EXTENDED RELEASE ORAL
Qty: 60 TABLET | Refills: 5 | Status: SHIPPED | OUTPATIENT
Start: 2022-05-20 | End: 2022-07-20 | Stop reason: DRUGHIGH

## 2022-05-20 NOTE — PROGRESS NOTES
"Chief Complaint  Anxiety (F/u)    Subjective          Jie Gold presents to [facility]  Discuss taking meloxicam for her joint pain. Orthopedics referred her to me for further refills. Needs to have labs done     I have reviewed the patient's medical history in detail and updated the computerized patient record.      Current Outpatient Medications:   •  acetaminophen (TYLENOL) 325 MG tablet, Take 650 mg by mouth Every 6 (Six) Hours As Needed for Mild Pain , Moderate Pain , Headache or Fever., Disp: , Rfl:   •  buPROPion SR (Wellbutrin SR) 150 MG 12 hr tablet, Take 1 tab daily x 2 weeks then 2 tabs daily, Disp: 60 tablet, Rfl: 5  •  fluticasone (FLONASE) 50 MCG/ACT nasal spray, 2 sprays into the nostril(s) as directed by provider Daily., Disp: , Rfl:   •  ibuprofen (ADVIL,MOTRIN) 800 MG tablet, Take 1 tablet by mouth Every 6 (Six) Hours As Needed for Mild Pain ., Disp: 120 tablet, Rfl: 1  •  meloxicam (MOBIC) 15 MG tablet, Take 1 tablet by mouth Daily., Disp: 30 tablet, Rfl: 0  •  Cholecalciferol (Vitamin D3) 1.25 MG (37971 UT) capsule, Take 1 tablet by mouth 2 (Two) Times a Week., Disp: 8 capsule, Rfl: 5  •  sertraline (ZOLOFT) 50 MG tablet, TAKE ONE TABLET BY MOUTH EVERY DAY, Disp: 90 tablet, Rfl: 1     Objective   Vital Signs:  /86 (BP Location: Right arm, Patient Position: Sitting, Cuff Size: Adult)   Pulse 82   Ht 162.6 cm (64\")   Wt 105 kg (232 lb)   SpO2 97%   BMI 39.82 kg/m²         Physical Exam  Constitutional:       Appearance: Normal appearance. She is obese.   Cardiovascular:      Rate and Rhythm: Normal rate and regular rhythm.      Pulses: Normal pulses.      Heart sounds: Normal heart sounds.   Pulmonary:      Effort: Pulmonary effort is normal.      Breath sounds: Normal breath sounds.   Skin:     General: Skin is warm and dry.   Neurological:      Mental Status: She is alert and oriented to person, place, and time.   Psychiatric:         Mood and " Affect: Mood normal.         Behavior: Behavior normal.         Thought Content: Thought content normal.         Judgment: Judgment normal.        Result Review :                 Assessment and Plan    Diagnoses and all orders for this visit:    1. Annual physical exam (Primary)  -     Comprehensive Metabolic Panel; Future  -     CBC (No Diff); Future  -     Lipid Panel With / Chol / HDL Ratio; Future    2. Chronic left shoulder pain  -     meloxicam (MOBIC) 15 MG tablet; Take 1 tablet by mouth Daily.  Dispense: 30 tablet; Refill: 0    3. Impaired fasting glucose  -     Hemoglobin A1c; Future    Other orders  -     buPROPion SR (Wellbutrin SR) 150 MG 12 hr tablet; Take 1 tab daily x 2 weeks then 2 tabs daily  Dispense: 60 tablet; Refill: 5    Ms. Gold appears to be doing well. I have ordered labs to assess her renal function. Her last CMP was normal. It was done 2 years ago. I have ordered the Meloxicam 15 mg daily for her and we discussed that she may need to stop the medication if she has a decreased renal function.   I have refilled her Wellbutrin  mg to be taken twice a day.  She is to follow up for an annual physical next months.          Follow Up   No follow-ups on file.  Patient was given instructions and counseling regarding her condition or for health maintenance advice. Please see specific information pulled into the AVS if appropriate.

## 2022-05-22 DIAGNOSIS — F41.9 ANXIETY: ICD-10-CM

## 2022-06-06 RX ORDER — TIZANIDINE 4 MG/1
TABLET ORAL
Qty: 42 TABLET | Refills: 1 | Status: SHIPPED | OUTPATIENT
Start: 2022-06-06 | End: 2022-10-19

## 2022-06-13 ENCOUNTER — OFFICE VISIT (OUTPATIENT)
Dept: FAMILY MEDICINE CLINIC | Facility: CLINIC | Age: 57
End: 2022-06-13

## 2022-06-13 VITALS
WEIGHT: 229 LBS | HEIGHT: 64 IN | HEART RATE: 89 BPM | BODY MASS INDEX: 39.09 KG/M2 | OXYGEN SATURATION: 98 % | DIASTOLIC BLOOD PRESSURE: 90 MMHG | SYSTOLIC BLOOD PRESSURE: 128 MMHG

## 2022-06-13 DIAGNOSIS — Z00.00 ANNUAL PHYSICAL EXAM: Primary | ICD-10-CM

## 2022-06-13 PROCEDURE — 99396 PREV VISIT EST AGE 40-64: CPT | Performed by: NURSE PRACTITIONER

## 2022-06-13 NOTE — PROGRESS NOTES
"Chief Complaint  Annual Exam    Subjective        Jie Gold presents to [facility]  Ms. Gold presents today for an annual physical exam with lab review.     I have reviewed the patient's medical history in detail and updated the computerized patient record.    Current Outpatient Medications:   •  acetaminophen (TYLENOL) 325 MG tablet, Take 650 mg by mouth Every 6 (Six) Hours As Needed for Mild Pain , Moderate Pain , Headache or Fever., Disp: , Rfl:   •  buPROPion SR (Wellbutrin SR) 150 MG 12 hr tablet, Take 1 tab daily x 2 weeks then 2 tabs daily, Disp: 60 tablet, Rfl: 5  •  Cholecalciferol (Vitamin D3) 1.25 MG (08793 UT) capsule, Take 1 tablet by mouth 2 (Two) Times a Week., Disp: 8 capsule, Rfl: 5  •  fluticasone (FLONASE) 50 MCG/ACT nasal spray, 2 sprays into the nostril(s) as directed by provider Daily., Disp: , Rfl:   •  ibuprofen (ADVIL,MOTRIN) 800 MG tablet, Take 1 tablet by mouth Every 6 (Six) Hours As Needed for Mild Pain ., Disp: 120 tablet, Rfl: 1  •  meloxicam (MOBIC) 15 MG tablet, Take 1 tablet by mouth Daily., Disp: 30 tablet, Rfl: 0  •  sertraline (ZOLOFT) 50 MG tablet, TAKE ONE TABLET BY MOUTH EVERY DAY, Disp: 90 tablet, Rfl: 1  •  tiZANidine (ZANAFLEX) 4 MG tablet, TAKE ONE TABLET BY MOUTH THREE TIMES DAILY FOR 14 DAYS, Disp: 42 tablet, Rfl: 1     Review of Systems   Constitutional: Negative.    HENT: Negative.    Eyes: Negative.    Respiratory: Negative.    Cardiovascular: Negative.    Gastrointestinal: Negative.    Genitourinary: Negative.    Musculoskeletal: Negative.    Skin: Negative.    Neurological: Negative.    Psychiatric/Behavioral: Negative.      Objective   Vital Signs:  /90   Pulse 89   Ht 162.6 cm (64.02\")   Wt 104 kg (229 lb)   SpO2 98%   BMI 39.29 kg/m²   Estimated body mass index is 39.29 kg/m² as calculated from the following:    Height as of this encounter: 162.6 cm (64.02\").    Weight as of this encounter: 104 kg (229 " lb).          Physical Exam  Vitals reviewed.   Constitutional:       Appearance: Normal appearance. She is obese.   HENT:      Right Ear: Tympanic membrane normal.      Left Ear: Tympanic membrane normal.   Cardiovascular:      Rate and Rhythm: Normal rate and regular rhythm.      Pulses: Normal pulses.      Heart sounds: Normal heart sounds.   Pulmonary:      Effort: Pulmonary effort is normal.      Breath sounds: Normal breath sounds.   Skin:     General: Skin is warm and dry.   Neurological:      Mental Status: She is alert and oriented to person, place, and time.   Psychiatric:         Mood and Affect: Mood normal.         Behavior: Behavior normal.         Thought Content: Thought content normal.         Judgment: Judgment normal.        Result Review :    Common labs    Common Labsle 6/7/22 6/7/22 6/7/22 6/7/22    0810 0810 0810 0810   Glucose 91      BUN 17      Creatinine 1.07 (A)      Sodium 140      Potassium 4.7      Chloride 104      Calcium 9.1      Total Protein 6.7      Albumin 4.3      Total Bilirubin 0.4      Alkaline Phosphatase 143 (A)      AST (SGOT) 19      ALT (SGPT) 17      WBC   5.4    Hemoglobin   13.7    Hematocrit   41.4    Platelets   239    Total Cholesterol    180   Triglycerides    194 (A)   HDL Cholesterol    36 (A)   LDL Cholesterol     110 (A)   Hemoglobin A1C  5.4     (A) Abnormal value       Comments are available for some flowsheets but are not being displayed.                     Assessment and Plan {CC Problem List  Visit Diagnosis   ROS  Review (Popup)  [facility] Maintenance  Quality  BestPractice  Medications  SmartSets  SnapShot Encounters  Media :23}  Diagnoses and all orders for this visit:    1. Annual physical exam (Primary)    Ms. Gold appears to be doing well today.  Her physical exam is within normal parameters for her age.  I have reviewed her labs.   We discussed using diet and exercise to help with the management of her lipid levels and her  weight.           Follow Up   Return in about 1 year (around 6/13/2023), or if symptoms worsen or fail to improve, for Annual physical, Fasting labs 1 week prior to next f/u.  Patient was given instructions and counseling regarding her condition or for health maintenance advice. Please see specific information pulled into the AVS if appropriate.

## 2022-06-27 DIAGNOSIS — G89.29 CHRONIC LEFT SHOULDER PAIN: ICD-10-CM

## 2022-06-27 DIAGNOSIS — M25.512 CHRONIC LEFT SHOULDER PAIN: ICD-10-CM

## 2022-06-27 RX ORDER — MELOXICAM 15 MG/1
TABLET ORAL
Qty: 30 TABLET | Refills: 5 | Status: SHIPPED | OUTPATIENT
Start: 2022-06-27 | End: 2023-02-22

## 2022-07-19 ENCOUNTER — TELEPHONE (OUTPATIENT)
Dept: FAMILY MEDICINE CLINIC | Facility: CLINIC | Age: 57
End: 2022-07-19

## 2022-07-19 NOTE — TELEPHONE ENCOUNTER
Caller: Jie Gold    Relationship: Self    Best call back number: [phone]*    What is the best time to reach you: ANYTIME    Who are you requesting to speak with (clinical staff, provider,  specific staff member): CLINICAL    What was the call regarding: PATIENT CALLING TO LET IDALIA NEWBERRY KNOW THAT SHE DOES WANT TO LOWER THE DOSE OF THE WELLBUTRIN. THE PATIENT REQUEST THE LOWER DOSAGE TO BE SENT TO HER PHARMACY.    PHARMACY CONFIRMED:  [facility] - [address] [facility] [phone] [place] [phone]   [phone]    Do you require a callback: NO, BUT IF THERE ARE QUESTIONS, PLEASE CALL.

## 2022-07-20 RX ORDER — BUPROPION HYDROCHLORIDE 75 MG/1
75 TABLET ORAL DAILY
Qty: 90 TABLET | Refills: 1 | Status: SHIPPED | OUTPATIENT
Start: 2022-07-20

## 2022-10-19 RX ORDER — TIZANIDINE 4 MG/1
TABLET ORAL
Qty: 42 TABLET | Refills: 1 | Status: SHIPPED | OUTPATIENT
Start: 2022-10-19

## 2022-11-10 DIAGNOSIS — F41.9 ANXIETY: ICD-10-CM

## 2023-02-22 DIAGNOSIS — G89.29 CHRONIC LEFT SHOULDER PAIN: ICD-10-CM

## 2023-02-22 DIAGNOSIS — M25.512 CHRONIC LEFT SHOULDER PAIN: ICD-10-CM

## 2023-02-22 RX ORDER — MELOXICAM 15 MG/1
TABLET ORAL
Qty: 30 TABLET | Refills: 5 | Status: SHIPPED | OUTPATIENT
Start: 2023-02-22

## 2023-04-18 RX ORDER — TIZANIDINE 4 MG/1
TABLET ORAL
Qty: 42 TABLET | Refills: 1 | Status: SHIPPED | OUTPATIENT
Start: 2023-04-18

## 2023-05-19 DIAGNOSIS — F41.9 ANXIETY: ICD-10-CM

## 2023-05-24 ENCOUNTER — OFFICE VISIT (OUTPATIENT)
Dept: FAMILY MEDICINE CLINIC | Facility: CLINIC | Age: 58
End: 2023-05-24
Payer: COMMERCIAL

## 2023-05-24 VITALS
SYSTOLIC BLOOD PRESSURE: 155 MMHG | OXYGEN SATURATION: 96 % | HEIGHT: 64 IN | HEART RATE: 89 BPM | DIASTOLIC BLOOD PRESSURE: 97 MMHG | WEIGHT: 228.3 LBS | BODY MASS INDEX: 38.98 KG/M2

## 2023-05-24 DIAGNOSIS — E55.9 VITAMIN D DEFICIENCY: ICD-10-CM

## 2023-05-24 DIAGNOSIS — R35.0 URINARY FREQUENCY: Primary | ICD-10-CM

## 2023-05-24 DIAGNOSIS — Z00.00 ANNUAL PHYSICAL EXAM: ICD-10-CM

## 2023-05-24 NOTE — PROGRESS NOTES
"Chief Complaint  Urinary Frequency    Subjective        Jie Gold presents to [facility]  History of Present Illness  She is here today with concerns that she is having frequent urination through out the day. Denies burning or UTI symptoms. She does report that the frequent urination goes away when she stops taking the Meloxicam. She reports she is voiding at least 4 to 5 times a day worse in the evening. She does drink 1.5 liters of Mountain Dew during the day at work and then another can of Mountain Dew at home in the evening along with a 16 oz bottle of water.       I have reviewed the patient's medical history in detail and updated the computerized patient record.      Current Outpatient Medications:   •  acetaminophen (TYLENOL) 325 MG tablet, Take 2 tablets by mouth Every 6 (Six) Hours As Needed for Mild Pain, Moderate Pain, Headache or Fever., Disp: , Rfl:   •  buPROPion (WELLBUTRIN) 75 MG tablet, Take 1 tablet by mouth Daily., Disp: 90 tablet, Rfl: 1  •  Cholecalciferol (Vitamin D3) 1.25 MG (29739 UT) capsule, Take 1 tablet by mouth 2 (Two) Times a Week., Disp: 8 capsule, Rfl: 5  •  fluticasone (FLONASE) 50 MCG/ACT nasal spray, 2 sprays into the nostril(s) as directed by provider Daily., Disp: , Rfl:   •  ibuprofen (ADVIL,MOTRIN) 800 MG tablet, Take 1 tablet by mouth Every 6 (Six) Hours As Needed for Mild Pain ., Disp: 120 tablet, Rfl: 1  •  meloxicam (MOBIC) 15 MG tablet, TAKE ONE TABLET BY MOUTH EVERY DAY, Disp: 30 tablet, Rfl: 5  •  sertraline (ZOLOFT) 50 MG tablet, TAKE ONE TABLET BY MOUTH EVERY DAY, Disp: 90 tablet, Rfl: 1  •  tiZANidine (ZANAFLEX) 4 MG tablet, TAKE ONE TABLET BY MOUTH THREE TIMES DAILY FOR 14 DAYS, Disp: 42 tablet, Rfl: 1       Objective   Vital Signs:  /97 (BP Location: Left arm, Patient Position: Sitting, Cuff Size: Adult)   Pulse 89   Ht 162.6 cm (64.02\")   Wt 104 kg (228 lb 4.8 oz)   SpO2 96%   BMI 39.17 kg/m²   Estimated body mass " "index is 39.17 kg/m² as calculated from the following:    Height as of this encounter: 162.6 cm (64.02\").    Weight as of this encounter: 104 kg (228 lb 4.8 oz).             Physical Exam  Vitals reviewed.   Constitutional:       Appearance: Normal appearance. She is obese.   Cardiovascular:      Rate and Rhythm: Normal rate and regular rhythm.      Pulses: Normal pulses.      Heart sounds: Normal heart sounds.   Pulmonary:      Effort: Pulmonary effort is normal.      Breath sounds: Normal breath sounds.   Skin:     General: Skin is warm and dry.   Neurological:      Mental Status: She is alert and oriented to person, place, and time.   Psychiatric:      Comments: No acute distress.         Result Review :                   Assessment and Plan   Diagnoses and all orders for this visit:    1. Urinary frequency (Primary)    2. Annual physical exam  -     CBC (No Diff); Future  -     Comprehensive Metabolic Panel; Future  -     Lipid Panel With / Chol / HDL Ratio; Future    3. Vitamin D deficiency  -     Vitamin D,25-Hydroxy; Future    Ms. Gold does not appear to be in any acute distress.   We discussed reasons she may have urinary frequency. The number one reason being the amount of Mountain Dew she drinks a day. She states she drinks at least a liter a day during work and then another 12 ounce can of Mountain Dew when she goes home. I have discussed with her that Mountain Dew is loaded with both caffeine and sugar which will increase her urination and contribute to her weight gain. She needs to wean herself off of the Mountain Dew and increase her water intake.   I will send her urine out to evaluate for a UTI.          Follow Up   Return if symptoms worsen or fail to improve, for Next scheduled follow up.  Patient was given instructions and counseling regarding her condition or for health maintenance advice. Please see specific information pulled into the AVS if appropriate.       "

## 2023-08-07 ENCOUNTER — OFFICE VISIT (OUTPATIENT)
Dept: FAMILY MEDICINE CLINIC | Facility: CLINIC | Age: 58
End: 2023-08-07
Payer: COMMERCIAL

## 2023-08-07 VITALS
DIASTOLIC BLOOD PRESSURE: 75 MMHG | OXYGEN SATURATION: 97 % | HEART RATE: 81 BPM | WEIGHT: 227.3 LBS | SYSTOLIC BLOOD PRESSURE: 130 MMHG | HEIGHT: 64 IN | BODY MASS INDEX: 38.8 KG/M2

## 2023-08-07 DIAGNOSIS — Z12.11 SCREEN FOR COLON CANCER: ICD-10-CM

## 2023-08-07 DIAGNOSIS — G43.909 MIGRAINE WITHOUT STATUS MIGRAINOSUS, NOT INTRACTABLE, UNSPECIFIED MIGRAINE TYPE: Primary | ICD-10-CM

## 2023-08-07 PROCEDURE — 99213 OFFICE O/P EST LOW 20 MIN: CPT | Performed by: NURSE PRACTITIONER

## 2023-08-07 RX ORDER — RIZATRIPTAN BENZOATE 10 MG/1
10 TABLET ORAL ONCE AS NEEDED
Qty: 20 TABLET | Refills: 5 | Status: SHIPPED | OUTPATIENT
Start: 2023-08-07

## 2023-08-07 RX ORDER — ELETRIPTAN HYDROBROMIDE 20 MG/1
20 TABLET, FILM COATED ORAL ONCE AS NEEDED
Qty: 9 TABLET | Refills: 1 | Status: SHIPPED | OUTPATIENT
Start: 2023-08-07 | End: 2023-08-07 | Stop reason: CLARIF

## 2023-08-07 NOTE — PROGRESS NOTES
Chief Complaint  Headache and Abdominal Pain    Алескандр Gold presents to [facility]  Abdominal Pain  This is a new problem. The current episode started today. The onset quality is sudden. The problem occurs intermittently. The problem has been waxing and waning. The pain is located in the epigastric region. The pain is at a severity of 8/10. The pain is moderate. Quality: nausiating pain. The abdominal pain does not radiate. Associated symptoms include belching, headaches and nausea. Pertinent negatives include no constipation, diarrhea, fever, flatus or vomiting. Nothing aggravates the pain. The pain is relieved by Nothing. She has tried nothing for the symptoms.   Headache  Providers seen:  Primary care provider  Quality:  Dull, throbbing and pounding  Pain severity:  8  Escalation timing:  Pain level doesn't change  Duration:  1 week  Headaches last more than three days?: Yes    Aggravating factors:  None  Changes in thinking and mood:  Not feeling right  Changes in vision:  None  Bilateral symptoms:  None  Stomach/GI changes:  Nausea and stomach pain  Changes in sensation:  None    I have reviewed the patient's medical history in detail and updated the computerized patient record.       Current Outpatient Medications:     acetaminophen (TYLENOL) 325 MG tablet, Take 2 tablets by mouth Every 6 (Six) Hours As Needed for Mild Pain, Moderate Pain, Headache or Fever., Disp: , Rfl:     fluticasone (FLONASE) 50 MCG/ACT nasal spray, 2 sprays into the nostril(s) as directed by provider Daily., Disp: , Rfl:     meloxicam (MOBIC) 15 MG tablet, TAKE ONE TABLET BY MOUTH EVERY DAY, Disp: 30 tablet, Rfl: 5    sertraline (ZOLOFT) 50 MG tablet, TAKE ONE TABLET BY MOUTH EVERY DAY, Disp: 90 tablet, Rfl: 1    tiZANidine (ZANAFLEX) 4 MG tablet, TAKE ONE TABLET BY MOUTH THREE TIMES DAILY FOR 14 DAYS, Disp: 42 tablet, Rfl: 1    rizatriptan (Maxalt) 10 MG tablet, Take 1 tablet by mouth  "1 (One) Time As Needed for Migraine. May repeat in 2 hours if needed, Disp: 20 tablet, Rfl: 5     Objective   Vital Signs:  /75 (BP Location: Left arm, Patient Position: Sitting, Cuff Size: Adult)   Pulse 81   Ht 162.6 cm (64\")   Wt 103 kg (227 lb 4.8 oz)   SpO2 97%   BMI 39.02 kg/mý   Estimated body mass index is 39.02 kg/mý as calculated from the following:    Height as of this encounter: 162.6 cm (64\").    Weight as of this encounter: 103 kg (227 lb 4.8 oz).             Physical Exam  Vitals reviewed.   Constitutional:       Appearance: Normal appearance.   Cardiovascular:      Rate and Rhythm: Normal rate and regular rhythm.      Pulses: Normal pulses.      Heart sounds: Normal heart sounds.   Pulmonary:      Effort: Pulmonary effort is normal.      Breath sounds: Normal breath sounds.   Neurological:      Mental Status: She is alert and oriented to person, place, and time.      Result Review :                   Assessment and Plan   Diagnoses and all orders for this visit:    1. Migraine without status migrainosus, not intractable, unspecified migraine type (Primary)  -     Discontinue: eletriptan (RELPAX) 20 MG tablet; Take 1 tablet by mouth 1 (One) Time As Needed for Migraine. may repeat in 2 hours if necessary  Dispense: 9 tablet; Refill: 1  -     rizatriptan (Maxalt) 10 MG tablet; Take 1 tablet by mouth 1 (One) Time As Needed for Migraine. May repeat in 2 hours if needed  Dispense: 20 tablet; Refill: 5    2. Screen for colon cancer  -     Cologuard - Stool, Per Rectum; Future    Ms. Gold appears to not feel well today.  She is having a migraine which is causing her GI symptoms.  She has a history of migraines but has not had one in years.  She is to take Rizatriptan 10 mg as instructed at the onset of a migraine.          Follow Up   Return if symptoms worsen or fail to improve, for Next scheduled follow up.  Patient was given instructions and counseling regarding her condition or for health " maintenance advice. Please see specific information pulled into the AVS if appropriate.

## 2023-09-05 ENCOUNTER — TELEPHONE (OUTPATIENT)
Dept: FAMILY MEDICINE CLINIC | Facility: CLINIC | Age: 58
End: 2023-09-05
Payer: COMMERCIAL

## 2023-11-17 DIAGNOSIS — F41.9 ANXIETY: ICD-10-CM

## 2023-11-19 DIAGNOSIS — G89.29 CHRONIC LEFT SHOULDER PAIN: ICD-10-CM

## 2023-11-19 DIAGNOSIS — M25.512 CHRONIC LEFT SHOULDER PAIN: ICD-10-CM

## 2023-11-20 RX ORDER — MELOXICAM 15 MG/1
TABLET ORAL
Qty: 30 TABLET | Refills: 5 | Status: SHIPPED | OUTPATIENT
Start: 2023-11-20

## 2023-12-21 ENCOUNTER — TELEPHONE (OUTPATIENT)
Dept: FAMILY MEDICINE CLINIC | Facility: CLINIC | Age: 58
End: 2023-12-21
Payer: COMMERCIAL

## 2023-12-21 NOTE — TELEPHONE ENCOUNTER
Per conversation with patient, going to [facility] plan on 1-1-24. Should have new card prior to appointment.

## 2024-01-08 ENCOUNTER — OFFICE VISIT (OUTPATIENT)
Dept: FAMILY MEDICINE CLINIC | Facility: CLINIC | Age: 59
End: 2024-01-08
Payer: COMMERCIAL

## 2024-01-08 VITALS
DIASTOLIC BLOOD PRESSURE: 96 MMHG | HEIGHT: 64 IN | SYSTOLIC BLOOD PRESSURE: 156 MMHG | BODY MASS INDEX: 39.16 KG/M2 | WEIGHT: 229.4 LBS | HEART RATE: 89 BPM | OXYGEN SATURATION: 97 %

## 2024-01-08 DIAGNOSIS — E78.2 MIXED HYPERLIPIDEMIA: Primary | ICD-10-CM

## 2024-01-08 PROCEDURE — 99213 OFFICE O/P EST LOW 20 MIN: CPT | Performed by: NURSE PRACTITIONER

## 2024-01-08 RX ORDER — DESVENLAFAXINE 25 MG/1
25 TABLET, EXTENDED RELEASE ORAL EVERY MORNING
COMMUNITY
Start: 2023-12-11

## 2024-01-08 NOTE — PROGRESS NOTES
"Chief Complaint  f/u lipids    Subjective        Jie Gold presents to [facility]  Hyperlipidemia  The current episode started more than 1 month ago. There are no known factors aggravating her hyperlipidemia. Pertinent negatives include no chest pain, leg pain or myalgias. Shortness of breath: with exertion.Current antihyperlipidemic treatment includes diet change and exercise. Compliance problems include adherence to exercise and adherence to diet.  Risk factors for coronary artery disease include obesity.       I have reviewed the patient's medical history in detail and updated the computerized patient record.       Current Outpatient Medications:     acetaminophen (TYLENOL) 325 MG tablet, Take 2 tablets by mouth Every 6 (Six) Hours As Needed for Mild Pain, Moderate Pain, Headache or Fever., Disp: , Rfl:     Desvenlafaxine Succinate ER 25 MG tablet sustained-release 24 hour, Take 1 tablet by mouth Every Morning., Disp: , Rfl:     fluticasone (FLONASE) 50 MCG/ACT nasal spray, 2 sprays into the nostril(s) as directed by provider Daily., Disp: , Rfl:     meloxicam (MOBIC) 15 MG tablet, TAKE ONE TABLET BY MOUTH EVERY DAY, Disp: 30 tablet, Rfl: 5    rizatriptan (Maxalt) 10 MG tablet, Take 1 tablet by mouth 1 (One) Time As Needed for Migraine. May repeat in 2 hours if needed, Disp: 20 tablet, Rfl: 5    tiZANidine (ZANAFLEX) 4 MG tablet, TAKE ONE TABLET BY MOUTH THREE TIMES DAILY FOR 14 DAYS, Disp: 42 tablet, Rfl: 1     Objective   Vital Signs:  /96 (BP Location: Right arm, Patient Position: Sitting, Cuff Size: Adult)   Pulse 89   Ht 162.6 cm (64.02\")   Wt 104 kg (229 lb 6.4 oz)   SpO2 97%   BMI 39.36 kg/m²   Estimated body mass index is 39.36 kg/m² as calculated from the following:    Height as of this encounter: 162.6 cm (64.02\").    Weight as of this encounter: 104 kg (229 lb 6.4 oz).       Physical Exam  Vitals reviewed.   Constitutional:       Appearance: Normal " "appearance. She is obese.   Cardiovascular:      Rate and Rhythm: Normal rate and regular rhythm.      Pulses: Normal pulses.      Heart sounds: Normal heart sounds.   Pulmonary:      Effort: Pulmonary effort is normal.      Breath sounds: Normal breath sounds.   Skin:     General: Skin is warm and dry.   Neurological:      Mental Status: She is alert and oriented to person, place, and time.   Psychiatric:      Comments: No acute distress        Result Review :    Lipid Panel          6/14/2023    11:49 12/21/2023    14:47   Lipid Panel   Total Cholesterol 157  179    Triglycerides 187  161    HDL Cholesterol 35  36    VLDL Cholesterol 32  29    LDL Cholesterol  90  114          Vitals:    01/08/24 1543 01/08/24 1610 01/08/24 1617   BP: 150/92 170/98 156/96   BP Location: Left arm Left arm Right arm   Patient Position: Sitting Sitting Sitting   Cuff Size: Adult Adult Adult   Pulse: 89     SpO2: 97%     Weight: 104 kg (229 lb 6.4 oz)     Height: 162.6 cm (64.02\")               Assessment and Plan   Diagnoses and all orders for this visit:    1. Mixed hyperlipidemia (Primary)    Ms. Gold appears to be doing well today.  I have reviewed her labs with her today. Her lipid levels are improving.   She is to continue to use diet and exercise to manage her lipid levels.   She is to return in 2 weeks to f/u on her blood pressure and discuss weight loss options.          Follow Up   Return in about 2 weeks (around 1/22/2024), or if symptoms worsen or fail to improve, for discuss weight loss medication and blood pressure.  Patient was given instructions and counseling regarding her condition or for health maintenance advice. Please see specific information pulled into the AVS if appropriate.         "

## 2024-01-19 RX ORDER — TIZANIDINE 4 MG/1
TABLET ORAL
Qty: 42 TABLET | Refills: 1 | Status: SHIPPED | OUTPATIENT
Start: 2024-01-19

## 2024-01-22 ENCOUNTER — OFFICE VISIT (OUTPATIENT)
Dept: FAMILY MEDICINE CLINIC | Facility: CLINIC | Age: 59
End: 2024-01-22
Payer: COMMERCIAL

## 2024-01-22 VITALS
HEART RATE: 74 BPM | WEIGHT: 230.1 LBS | DIASTOLIC BLOOD PRESSURE: 88 MMHG | BODY MASS INDEX: 39.28 KG/M2 | OXYGEN SATURATION: 98 % | SYSTOLIC BLOOD PRESSURE: 160 MMHG | HEIGHT: 64 IN

## 2024-01-22 DIAGNOSIS — I10 PRIMARY HYPERTENSION: Primary | ICD-10-CM

## 2024-01-22 DIAGNOSIS — E66.01 MORBID (SEVERE) OBESITY DUE TO EXCESS CALORIES: ICD-10-CM

## 2024-01-22 PROCEDURE — 99214 OFFICE O/P EST MOD 30 MIN: CPT | Performed by: NURSE PRACTITIONER

## 2024-01-22 RX ORDER — SEMAGLUTIDE 0.25 MG/.5ML
0.25 INJECTION, SOLUTION SUBCUTANEOUS WEEKLY
Qty: 2 ML | Refills: 0 | Status: SHIPPED | OUTPATIENT
Start: 2024-01-22

## 2024-01-22 RX ORDER — LISINOPRIL 20 MG/1
20 TABLET ORAL DAILY
Qty: 30 TABLET | Refills: 5 | Status: SHIPPED | OUTPATIENT
Start: 2024-01-22

## 2024-01-22 NOTE — PROGRESS NOTES
Chief Complaint  Hypertension (2 week f/u) and discuss weightloss medications    Subjective        Jie Gold presents to [facility]  Hypertension  This is a new problem. Associated symptoms include headaches (sometimes), malaise/fatigue, orthopnea, palpitations (sometimes) and shortness of breath (with walking). Pertinent negatives include no chest pain or peripheral edema. Risk factors for coronary artery disease include obesity. Past treatments include nothing.   Obesity  This is a chronic problem. The problem occurs constantly. The problem has been gradually worsening. Associated symptoms include arthralgias, headaches (sometimes) and myalgias. Pertinent negatives include no chest pain. Nothing aggravates the symptoms. Treatments tried: diets, exercise, weight watchers, low carb, fluoxetine/wellbutrin. The treatment provided mild relief.       I have reviewed the patient's medical history in detail and updated the computerized patient record.       Current Outpatient Medications:     acetaminophen (TYLENOL) 325 MG tablet, Take 2 tablets by mouth Every 6 (Six) Hours As Needed for Mild Pain, Moderate Pain, Headache or Fever., Disp: , Rfl:     Desvenlafaxine Succinate ER 25 MG tablet sustained-release 24 hour, Take 1 tablet by mouth Every Morning., Disp: , Rfl:     fluticasone (FLONASE) 50 MCG/ACT nasal spray, 2 sprays into the nostril(s) as directed by provider Daily., Disp: , Rfl:     meloxicam (MOBIC) 15 MG tablet, TAKE ONE TABLET BY MOUTH EVERY DAY, Disp: 30 tablet, Rfl: 5    rizatriptan (Maxalt) 10 MG tablet, Take 1 tablet by mouth 1 (One) Time As Needed for Migraine. May repeat in 2 hours if needed, Disp: 20 tablet, Rfl: 5    tiZANidine (ZANAFLEX) 4 MG tablet, TAKE ONE TABLET BY MOUTH THREE TIMES DAILY FOR 14 DAYS, Disp: 42 tablet, Rfl: 1    lisinopril (PRINIVIL,ZESTRIL) 20 MG tablet, Take 1 tablet by mouth Daily., Disp: 30 tablet, Rfl: 5    Semaglutide-Weight Management  "(Wegovy) 0.25 MG/0.5ML solution auto-injector, Inject 0.25 mg under the skin into the appropriate area as directed 1 (One) Time Per Week., Disp: 2 mL, Rfl: 0     Objective   Vital Signs:  /88 (BP Location: Left arm, Patient Position: Sitting, Cuff Size: Adult)   Pulse 74   Ht 162.6 cm (64.02\")   Wt 104 kg (230 lb 1.6 oz)   SpO2 98%   BMI 39.48 kg/m²   Estimated body mass index is 39.48 kg/m² as calculated from the following:    Height as of this encounter: 162.6 cm (64.02\").    Weight as of this encounter: 104 kg (230 lb 1.6 oz).       Physical Exam  Vitals reviewed.   Constitutional:       Appearance: Normal appearance. She is obese.   Cardiovascular:      Rate and Rhythm: Normal rate and regular rhythm.      Pulses: Normal pulses.      Heart sounds: Normal heart sounds.   Pulmonary:      Effort: Pulmonary effort is normal.      Breath sounds: Normal breath sounds.   Skin:     General: Skin is warm and dry.   Neurological:      Mental Status: She is alert and oriented to person, place, and time.   Psychiatric:      Comments: No acute distress        Result Review :                     Assessment and Plan     Diagnoses and all orders for this visit:    1. Primary hypertension (Primary)  -     lisinopril (PRINIVIL,ZESTRIL) 20 MG tablet; Take 1 tablet by mouth Daily.  Dispense: 30 tablet; Refill: 5    2. Morbid (severe) obesity due to excess calories  -     Semaglutide-Weight Management (Wegovy) 0.25 MG/0.5ML solution auto-injector; Inject 0.25 mg under the skin into the appropriate area as directed 1 (One) Time Per Week.  Dispense: 2 mL; Refill: 0    Ms. Alla rick not appear to be in any acute distress.  Her blood pressure is elevated at 160/88. She is to start Lisinopril 20 mg daily for her blood pressure.  We have discussed her obesity and associated symptoms. We have discussed weight loss options.  She is to start Wegovy 0.25 mg /0.5 ml weekly for 4 weeks then she is to increase the dose to 0.5 mg " weekly for 4 weeks and then 1 mg weekly for 4 weeks. She is to follow up in 3 months on her weight loss efforts.          Follow Up     Return in about 3 months (around 4/22/2024) for on weight and blood pressure .  Patient was given instructions and counseling regarding her condition or for health maintenance advice. Please see specific information pulled into the AVS if appropriate.

## 2024-02-16 ENCOUNTER — TRANSCRIBE ORDERS (OUTPATIENT)
Dept: ADMINISTRATIVE | Facility: HOSPITAL | Age: 59
End: 2024-02-16
Payer: COMMERCIAL

## 2024-02-16 DIAGNOSIS — M54.14 RADICULAR PAIN OF THORACIC REGION: ICD-10-CM

## 2024-02-16 DIAGNOSIS — M54.50 LUMBAR PAIN: Primary | ICD-10-CM

## 2024-02-27 DIAGNOSIS — I10 PRIMARY HYPERTENSION: Primary | ICD-10-CM

## 2024-02-27 RX ORDER — LOSARTAN POTASSIUM 50 MG/1
50 TABLET ORAL DAILY
Qty: 90 TABLET | Refills: 1 | Status: SHIPPED | OUTPATIENT
Start: 2024-02-27

## 2024-03-15 ENCOUNTER — OFFICE VISIT (OUTPATIENT)
Dept: FAMILY MEDICINE CLINIC | Facility: CLINIC | Age: 59
End: 2024-03-15
Payer: COMMERCIAL

## 2024-03-15 VITALS
HEART RATE: 89 BPM | HEIGHT: 64 IN | BODY MASS INDEX: 38.46 KG/M2 | WEIGHT: 225.3 LBS | DIASTOLIC BLOOD PRESSURE: 78 MMHG | SYSTOLIC BLOOD PRESSURE: 122 MMHG | OXYGEN SATURATION: 98 %

## 2024-03-15 DIAGNOSIS — F41.9 ANXIETY: Primary | ICD-10-CM

## 2024-03-15 PROCEDURE — 99213 OFFICE O/P EST LOW 20 MIN: CPT | Performed by: NURSE PRACTITIONER

## 2024-03-15 RX ORDER — DESVENLAFAXINE 25 MG/1
25 TABLET, EXTENDED RELEASE ORAL EVERY MORNING
Qty: 90 TABLET | Refills: 1 | Status: SHIPPED | OUTPATIENT
Start: 2024-03-15

## 2024-03-15 NOTE — PROGRESS NOTES
"Chief Complaint  patient wanting to continue Pristiq     Subjective        Jie Gold presents to [facility]  History of Present Illness  Ms Gold presents today to get refills on Pristiq. She reports that she is doing well on the medication.       I have reviewed the patient's medical history in detail and updated the computerized patient record.       Current Outpatient Medications:     acetaminophen (TYLENOL) 325 MG tablet, Take 2 tablets by mouth Every 6 (Six) Hours As Needed for Mild Pain, Moderate Pain, Headache or Fever., Disp: , Rfl:     fluticasone (FLONASE) 50 MCG/ACT nasal spray, 2 sprays into the nostril(s) as directed by provider Daily., Disp: , Rfl:     losartan (Cozaar) 50 MG tablet, Take 1 tablet by mouth Daily., Disp: 90 tablet, Rfl: 1    meloxicam (MOBIC) 15 MG tablet, TAKE ONE TABLET BY MOUTH EVERY DAY, Disp: 30 tablet, Rfl: 5    rizatriptan (Maxalt) 10 MG tablet, Take 1 tablet by mouth 1 (One) Time As Needed for Migraine. May repeat in 2 hours if needed, Disp: 20 tablet, Rfl: 5    tiZANidine (ZANAFLEX) 4 MG tablet, TAKE ONE TABLET BY MOUTH THREE TIMES DAILY FOR 14 DAYS, Disp: 42 tablet, Rfl: 1    Desvenlafaxine Succinate ER 25 MG tablet sustained-release 24 hour, Take 1 tablet by mouth Every Morning., Disp: 90 tablet, Rfl: 1    Semaglutide-Weight Management (Wegovy) 0.25 MG/0.5ML solution auto-injector, Inject 0.25 mg under the skin into the appropriate area as directed 1 (One) Time Per Week. (Patient not taking: Reported on 3/15/2024), Disp: 2 mL, Rfl: 0       Objective   Vital Signs:  /78 (BP Location: Left arm, Patient Position: Sitting, Cuff Size: Adult)   Pulse 89   Ht 162.6 cm (64.02\")   Wt 102 kg (225 lb 4.8 oz)   SpO2 98%   BMI 38.65 kg/m²   Estimated body mass index is 38.65 kg/m² as calculated from the following:    Height as of this encounter: 162.6 cm (64.02\").    Weight as of this encounter: 102 kg (225 lb 4.8 oz).       Physical " Exam  Vitals reviewed.   Constitutional:       Appearance: Normal appearance. She is obese.   Cardiovascular:      Rate and Rhythm: Normal rate and regular rhythm.      Pulses: Normal pulses.      Heart sounds: Normal heart sounds.   Pulmonary:      Effort: Pulmonary effort is normal.      Breath sounds: Normal breath sounds.   Skin:     General: Skin is warm and dry.   Neurological:      Mental Status: She is alert and oriented to person, place, and time.   Psychiatric:         Mood and Affect: Mood normal.         Behavior: Behavior normal.         Thought Content: Thought content normal.         Judgment: Judgment normal.        Result Review :                     Assessment and Plan     Diagnoses and all orders for this visit:    1. Anxiety (Primary)  -     Desvenlafaxine Succinate ER 25 MG tablet sustained-release 24 hour; Take 1 tablet by mouth Every Morning.  Dispense: 90 tablet; Refill: 1    MsNithya Gold appears to be doing well today.  We have discussed her anxiety and she voices no concerns.   She is to continue to take Desvenlafaxine ER 25 mg daily.  She is to follow up as needed and at her next scheduled appointment.          Follow Up     Return if symptoms worsen or fail to improve, for Next scheduled follow up.  Patient was given instructions and counseling regarding her condition or for health maintenance advice. Please see specific information pulled into the AVS if appropriate.

## 2024-05-10 DIAGNOSIS — F41.9 ANXIETY: ICD-10-CM

## 2024-05-15 DIAGNOSIS — G43.909 MIGRAINE WITHOUT STATUS MIGRAINOSUS, NOT INTRACTABLE, UNSPECIFIED MIGRAINE TYPE: ICD-10-CM

## 2024-05-15 RX ORDER — RIZATRIPTAN BENZOATE 10 MG/1
TABLET ORAL
Qty: 20 TABLET | Refills: 5 | Status: SHIPPED | OUTPATIENT
Start: 2024-05-15

## 2024-07-04 DIAGNOSIS — G89.29 CHRONIC LEFT SHOULDER PAIN: ICD-10-CM

## 2024-07-04 DIAGNOSIS — M25.512 CHRONIC LEFT SHOULDER PAIN: ICD-10-CM

## 2024-07-08 RX ORDER — MELOXICAM 15 MG/1
TABLET ORAL
Qty: 30 TABLET | Refills: 5 | Status: SHIPPED | OUTPATIENT
Start: 2024-07-08

## 2024-10-30 DIAGNOSIS — F41.9 ANXIETY: ICD-10-CM

## 2024-10-30 RX ORDER — DESVENLAFAXINE 25 MG/1
25 TABLET, EXTENDED RELEASE ORAL EVERY MORNING
Qty: 90 TABLET | Refills: 1 | Status: SHIPPED | OUTPATIENT
Start: 2024-10-30

## 2024-11-18 DIAGNOSIS — G43.909 MIGRAINE WITHOUT STATUS MIGRAINOSUS, NOT INTRACTABLE, UNSPECIFIED MIGRAINE TYPE: ICD-10-CM

## 2024-11-18 RX ORDER — RIZATRIPTAN BENZOATE 10 MG/1
10 TABLET ORAL DAILY
Qty: 20 TABLET | Refills: 5 | Status: SHIPPED | OUTPATIENT
Start: 2024-11-18

## 2024-12-04 DIAGNOSIS — F41.9 ANXIETY: ICD-10-CM

## 2024-12-04 DIAGNOSIS — M25.512 CHRONIC LEFT SHOULDER PAIN: ICD-10-CM

## 2024-12-04 DIAGNOSIS — G89.29 CHRONIC LEFT SHOULDER PAIN: ICD-10-CM

## 2024-12-04 RX ORDER — DESVENLAFAXINE 25 MG/1
25 TABLET, EXTENDED RELEASE ORAL EVERY MORNING
Qty: 90 TABLET | Refills: 1 | Status: SHIPPED | OUTPATIENT
Start: 2024-12-04

## 2024-12-04 RX ORDER — MELOXICAM 15 MG/1
15 TABLET ORAL DAILY
Qty: 30 TABLET | Refills: 5 | Status: SHIPPED | OUTPATIENT
Start: 2024-12-04

## 2024-12-04 NOTE — TELEPHONE ENCOUNTER
Caller: Jie Gold    Relationship: Self    Best call back number: [phone]     Requested Prescriptions:   Requested Prescriptions     Pending Prescriptions Disp Refills    meloxicam (MOBIC) 15 MG tablet 30 tablet 5     Sig: Take 1 tablet by mouth Daily.    Desvenlafaxine Succinate ER 25 MG tablet sustained-release 24 hour 90 tablet 1     Sig: Take 1 tablet by mouth Every Morning.        Pharmacy where request should be sent: [address]  - [phone]  - [phone] FX     Last office visit with prescribing clinician: 3/15/2024   Last telemedicine visit with prescribing clinician: Visit date not found   Next office visit with prescribing clinician: Visit date not found     Additional details provided by patient: PATIENT IS NEEDING MEDICATION SENT TO NEW PHARMACY.     Does the patient have less than a 3 day supply:  [x] Yes  [] No    Would you like a call back once the refill request has been completed: [] Yes [x] No    If the office needs to give you a call back, can they leave a voicemail: [x] Yes [] No    Erica Murphy Rep   12/04/24 14:39 EST

## 2025-05-13 DIAGNOSIS — G43.909 MIGRAINE WITHOUT STATUS MIGRAINOSUS, NOT INTRACTABLE, UNSPECIFIED MIGRAINE TYPE: Primary | ICD-10-CM

## 2025-05-15 DIAGNOSIS — M25.512 CHRONIC LEFT SHOULDER PAIN: ICD-10-CM

## 2025-05-15 DIAGNOSIS — G89.29 CHRONIC LEFT SHOULDER PAIN: ICD-10-CM

## 2025-05-15 RX ORDER — MELOXICAM 15 MG/1
15 TABLET ORAL DAILY
Qty: 30 TABLET | Refills: 0 | Status: SHIPPED | OUTPATIENT
Start: 2025-05-15

## 2025-06-13 DIAGNOSIS — G89.29 CHRONIC LEFT SHOULDER PAIN: ICD-10-CM

## 2025-06-13 DIAGNOSIS — M25.512 CHRONIC LEFT SHOULDER PAIN: ICD-10-CM

## 2025-06-13 RX ORDER — MELOXICAM 15 MG/1
15 TABLET ORAL DAILY
Qty: 30 TABLET | Refills: 0 | Status: SHIPPED | OUTPATIENT
Start: 2025-06-13

## 2025-06-20 ENCOUNTER — OFFICE VISIT (OUTPATIENT)
Dept: FAMILY MEDICINE CLINIC | Facility: CLINIC | Age: 60
End: 2025-06-20
Payer: COMMERCIAL

## 2025-06-20 VITALS
DIASTOLIC BLOOD PRESSURE: 94 MMHG | SYSTOLIC BLOOD PRESSURE: 142 MMHG | WEIGHT: 186.6 LBS | HEART RATE: 77 BPM | BODY MASS INDEX: 31.86 KG/M2 | HEIGHT: 64 IN | OXYGEN SATURATION: 98 % | TEMPERATURE: 98.6 F

## 2025-06-20 DIAGNOSIS — M62.838 MUSCLE SPASM: Primary | ICD-10-CM

## 2025-06-20 DIAGNOSIS — F41.9 ANXIETY: ICD-10-CM

## 2025-06-20 PROCEDURE — 99213 OFFICE O/P EST LOW 20 MIN: CPT | Performed by: NURSE PRACTITIONER

## 2025-06-20 RX ORDER — TIRZEPATIDE 2.5 MG/.5ML
INJECTION, SOLUTION SUBCUTANEOUS
COMMUNITY
Start: 2025-05-29

## 2025-06-20 RX ORDER — DESVENLAFAXINE 50 MG/1
50 TABLET, FILM COATED, EXTENDED RELEASE ORAL EVERY MORNING
Qty: 90 TABLET | Refills: 3 | Status: SHIPPED | OUTPATIENT
Start: 2025-06-20

## 2025-06-20 RX ORDER — ONDANSETRON 4 MG/1
TABLET, FILM COATED ORAL
COMMUNITY
Start: 2025-05-07

## 2025-06-20 RX ORDER — RIZATRIPTAN BENZOATE 10 MG/1
1 TABLET ORAL DAILY
COMMUNITY
Start: 2025-06-08

## 2025-06-20 NOTE — PROGRESS NOTES
"Chief Complaint  Med Refill    Subjective        Jie Gold presents to [facility]  History of Present Illness  Needs medication refills. Feels she is more anxious and needs to have her medication increased. She also needs to have her muscle relaxant refilled.       I have reviewed the patient's medical history in detail and updated the computerized patient record.       Current Outpatient Medications:     acetaminophen (TYLENOL) 325 MG tablet, Take 2 tablets by mouth Every 6 (Six) Hours As Needed for Mild Pain, Moderate Pain, Headache or Fever., Disp: , Rfl:     Desvenlafaxine Succinate ER (PRISTIQ) 50 MG 24 hr tablet, Take 1 tablet by mouth Every Morning., Disp: 90 tablet, Rfl: 3    fluticasone (FLONASE) 50 MCG/ACT nasal spray, Administer 2 sprays into the nostril(s) as directed by provider Daily., Disp: , Rfl:     meloxicam (MOBIC) 15 MG tablet, Take 1 tablet by mouth once daily, Disp: 30 tablet, Rfl: 0    Mounjaro 2.5 MG/0.5ML solution auto-injector, INJECT 2.5MG SUBCUTANEOUSLY ONCE PER WEEK, Disp: , Rfl:     ondansetron (ZOFRAN) 4 MG tablet, TAKE 1 TABLET BY MOUTH EVERY 6 HOURS AS NEEDED FOR NAUSEA FOR 5 DAYS, Disp: , Rfl:     rizatriptan (MAXALT) 10 MG tablet, Take 1 tablet by mouth Daily., Disp: , Rfl:     tiZANidine (ZANAFLEX) 4 MG tablet, Take 1 tablet by mouth Every 8 (Eight) Hours As Needed for Muscle Spasms., Disp: 42 tablet, Rfl: 3       Objective   Vital Signs:  /94 (BP Location: Left arm, Patient Position: Sitting, Cuff Size: Adult)   Pulse 77   Temp 98.6 °F (37 °C) (Temporal)   Ht 162.6 cm (64\")   Wt 84.6 kg (186 lb 9.6 oz)   SpO2 98%   BMI 32.03 kg/m²   Estimated body mass index is 32.03 kg/m² as calculated from the following:    Height as of this encounter: 162.6 cm (64\").    Weight as of this encounter: 84.6 kg (186 lb 9.6 oz).          Physical Exam  Vitals reviewed.   Constitutional:       Appearance: Normal appearance. She is obese. "   Cardiovascular:      Rate and Rhythm: Normal rate and regular rhythm.      Pulses: Normal pulses.      Heart sounds: Normal heart sounds.   Pulmonary:      Effort: Pulmonary effort is normal.      Breath sounds: Normal breath sounds.   Skin:     General: Skin is warm and dry.   Neurological:      Mental Status: She is alert and oriented to person, place, and time.   Psychiatric:      Comments: No acute distress        Result Review :                Assessment and Plan   Diagnoses and all orders for this visit:    1. Muscle spasm (Primary)    2. Anxiety  -     Desvenlafaxine Succinate ER (PRISTIQ) 50 MG 24 hr tablet; Take 1 tablet by mouth Every Morning.  Dispense: 90 tablet; Refill: 3    Other orders  -     tiZANidine (ZANAFLEX) 4 MG tablet; Take 1 tablet by mouth Every 8 (Eight) Hours As Needed for Muscle Spasms.  Dispense: 42 tablet; Refill: 3    Ms. Alla does not appear to be in any acute distress.  I have increased the Pristiq to 50 mg daily.  I have also refilled the Tizanidine 4 mg to take every 8 hours as needed for muscle spasms.          Follow Up   Return if symptoms worsen or fail to improve, for Next scheduled follow up.  Patient was given instructions and counseling regarding her condition or for health maintenance advice. Please see specific information pulled into the AVS if appropriate.

## 2025-07-12 DIAGNOSIS — G89.29 CHRONIC LEFT SHOULDER PAIN: ICD-10-CM

## 2025-07-12 DIAGNOSIS — M25.512 CHRONIC LEFT SHOULDER PAIN: ICD-10-CM

## 2025-07-12 DIAGNOSIS — F41.9 ANXIETY: ICD-10-CM

## 2025-07-15 RX ORDER — MELOXICAM 15 MG/1
15 TABLET ORAL DAILY
Qty: 30 TABLET | Refills: 0 | OUTPATIENT
Start: 2025-07-15

## 2025-07-15 RX ORDER — DESVENLAFAXINE 25 MG/1
25 TABLET, EXTENDED RELEASE ORAL EVERY MORNING
Qty: 90 TABLET | Refills: 0 | OUTPATIENT
Start: 2025-07-15

## 2025-07-29 DIAGNOSIS — G89.29 CHRONIC LEFT SHOULDER PAIN: ICD-10-CM

## 2025-07-29 DIAGNOSIS — M25.512 CHRONIC LEFT SHOULDER PAIN: ICD-10-CM

## 2025-07-29 RX ORDER — MELOXICAM 15 MG/1
15 TABLET ORAL DAILY
Qty: 30 TABLET | Refills: 0 | Status: SHIPPED | OUTPATIENT
Start: 2025-07-29 | End: 2025-07-30 | Stop reason: ALTCHOICE

## 2025-07-30 ENCOUNTER — OFFICE VISIT (OUTPATIENT)
Dept: FAMILY MEDICINE CLINIC | Facility: CLINIC | Age: 60
End: 2025-07-30
Payer: COMMERCIAL

## 2025-07-30 VITALS
HEART RATE: 78 BPM | OXYGEN SATURATION: 97 % | BODY MASS INDEX: 31.45 KG/M2 | HEIGHT: 64 IN | TEMPERATURE: 98.2 F | SYSTOLIC BLOOD PRESSURE: 140 MMHG | WEIGHT: 184.2 LBS | DIASTOLIC BLOOD PRESSURE: 86 MMHG

## 2025-07-30 DIAGNOSIS — R03.0 ELEVATED BLOOD-PRESSURE READING WITHOUT DIAGNOSIS OF HYPERTENSION: Primary | ICD-10-CM

## 2025-07-30 PROCEDURE — 99213 OFFICE O/P EST LOW 20 MIN: CPT | Performed by: NURSE PRACTITIONER

## 2025-07-30 RX ORDER — DICLOFENAC SODIUM 100 MG/1
100 TABLET, EXTENDED RELEASE ORAL 2 TIMES DAILY
Qty: 60 TABLET | Refills: 5 | Status: SHIPPED | OUTPATIENT
Start: 2025-07-30

## 2025-07-30 RX ORDER — PROPRANOLOL HYDROCHLORIDE 60 MG/1
60 CAPSULE, EXTENDED RELEASE ORAL DAILY
Qty: 90 CAPSULE | Refills: 3 | Status: SHIPPED | OUTPATIENT
Start: 2025-07-30

## 2025-07-30 NOTE — PROGRESS NOTES
"Chief Complaint  OTHER (Patient is concerned with her blood pressure)    Subjective        Jie Gold presents to [facility]  History of Present Illness  Ms. Gold is here because her  blood pressure is running in the 140/80-90 range. She has also been having more headaches. This has been going on for several weeks.       I have reviewed the patient's medical history in detail and updated the computerized patient record.     Current Outpatient Medications:     acetaminophen (TYLENOL) 325 MG tablet, Take 2 tablets by mouth Every 6 (Six) Hours As Needed for Mild Pain, Moderate Pain, Headache or Fever., Disp: , Rfl:     Desvenlafaxine Succinate ER (PRISTIQ) 50 MG 24 hr tablet, Take 1 tablet by mouth Every Morning., Disp: 90 tablet, Rfl: 3    fluticasone (FLONASE) 50 MCG/ACT nasal spray, Administer 2 sprays into the nostril(s) as directed by provider Daily., Disp: , Rfl:     Mounjaro 2.5 MG/0.5ML solution auto-injector, INJECT 2.5MG SUBCUTANEOUSLY ONCE PER WEEK, Disp: , Rfl:     ondansetron (ZOFRAN) 4 MG tablet, TAKE 1 TABLET BY MOUTH EVERY 6 HOURS AS NEEDED FOR NAUSEA FOR 5 DAYS, Disp: , Rfl:     rizatriptan (MAXALT) 10 MG tablet, Take 1 tablet by mouth Daily., Disp: , Rfl:     tiZANidine (ZANAFLEX) 4 MG tablet, Take 1 tablet by mouth Every 8 (Eight) Hours As Needed for Muscle Spasms., Disp: 42 tablet, Rfl: 3    diclofenac sodium (VOTAREN XR) 100 MG 24 hr tablet, Take 1 tablet by mouth 2 (Two) Times a Day., Disp: 60 tablet, Rfl: 5    propranolol LA (Inderal LA) 60 MG 24 hr capsule, Take 1 capsule by mouth Daily., Disp: 90 capsule, Rfl: 3       Objective   Vital Signs:  /86 (BP Location: Right arm, Patient Position: Sitting, Cuff Size: Adult)   Pulse 78   Temp 98.2 °F (36.8 °C) (Temporal)   Ht 162.6 cm (64.02\")   Wt 83.6 kg (184 lb 3.2 oz)   SpO2 97%   BMI 31.60 kg/m²   Estimated body mass index is 31.6 kg/m² as calculated from the following:    Height as of this " "encounter: 162.6 cm (64.02\").    Weight as of this encounter: 83.6 kg (184 lb 3.2 oz).          Physical Exam  Vitals reviewed.   Constitutional:       Appearance: Normal appearance.   Cardiovascular:      Rate and Rhythm: Normal rate and regular rhythm.      Pulses: Normal pulses.      Heart sounds: Normal heart sounds.   Pulmonary:      Effort: Pulmonary effort is normal.      Breath sounds: Normal breath sounds.   Skin:     General: Skin is warm and dry.   Neurological:      Mental Status: She is alert and oriented to person, place, and time.   Psychiatric:      Comments: No acute distress        Result Review :                Assessment and Plan   Diagnoses and all orders for this visit:    1. Elevated blood-pressure reading without diagnosis of hypertension (Primary)    Other orders  -     diclofenac sodium (VOTAREN XR) 100 MG 24 hr tablet; Take 1 tablet by mouth 2 (Two) Times a Day.  Dispense: 60 tablet; Refill: 5  -     propranolol LA (Inderal LA) 60 MG 24 hr capsule; Take 1 capsule by mouth Daily.  Dispense: 90 capsule; Refill: 3    MsNithya Gold appears to be in no acute distress  We  have discussed her concerns and possible causes of her increased blood pressure. She is to start taking Propranolol LA 60 mg every night for both her headaches and her blood pressure.  She only takes the Maxallt when she has a migraine.   She is to stop taking the Meloxicam and start Diclofenac  mg twice a day.          Follow Up   Return if symptoms worsen or fail to improve, for Next scheduled follow up.  Patient was given instructions and counseling regarding her condition or for health maintenance advice. Please see specific information pulled into the AVS if appropriate.             "

## 2025-08-24 DIAGNOSIS — G89.29 CHRONIC LEFT SHOULDER PAIN: ICD-10-CM

## 2025-08-24 DIAGNOSIS — M25.512 CHRONIC LEFT SHOULDER PAIN: ICD-10-CM

## 2025-08-25 RX ORDER — MELOXICAM 15 MG/1
15 TABLET ORAL DAILY
Qty: 30 TABLET | Refills: 0 | OUTPATIENT
Start: 2025-08-25